# Patient Record
Sex: FEMALE | Race: BLACK OR AFRICAN AMERICAN | Employment: OTHER | URBAN - METROPOLITAN AREA
[De-identification: names, ages, dates, MRNs, and addresses within clinical notes are randomized per-mention and may not be internally consistent; named-entity substitution may affect disease eponyms.]

---

## 2017-01-19 ENCOUNTER — OFFICE VISIT (OUTPATIENT)
Dept: INTERNAL MEDICINE CLINIC | Age: 67
End: 2017-01-19

## 2017-01-19 VITALS
HEIGHT: 68 IN | WEIGHT: 225 LBS | BODY MASS INDEX: 34.1 KG/M2 | DIASTOLIC BLOOD PRESSURE: 69 MMHG | HEART RATE: 59 BPM | RESPIRATION RATE: 16 BRPM | TEMPERATURE: 98.1 F | OXYGEN SATURATION: 99 % | SYSTOLIC BLOOD PRESSURE: 132 MMHG

## 2017-01-19 DIAGNOSIS — E78.00 PURE HYPERCHOLESTEROLEMIA: ICD-10-CM

## 2017-01-19 DIAGNOSIS — I10 ESSENTIAL HYPERTENSION: Primary | ICD-10-CM

## 2017-01-19 RX ORDER — ASPIRIN 81 MG/1
TABLET ORAL DAILY
COMMUNITY
End: 2022-07-18

## 2017-01-19 NOTE — MR AVS SNAPSHOT
Visit Information Date & Time Provider Department Dept. Phone Encounter #  
 1/19/2017 10:45 AM Sariah Galindo MD Former[facility], [facility] [phone] [id number] Follow-up Instructions Return in about 6 months (around 7/19/2017). Follow-up and Disposition History Upcoming Health Maintenance Date Due Hepatitis C Screening 1950 COLONOSCOPY 5/2/2018 BREAST CANCER SCRN MAMMOGRAM 11/22/2018 DTaP/Tdap/Td series (2 - Td) 9/5/2023 Allergies as of 1/19/2017  Review Complete On: 1/19/2017 By: Harvinder Sweeney LPN Severity Noted Reaction Type Reactions Percocet [Oxycodone-acetaminophen]  04/30/2010    Unknown (comments) Sharp pain in neck Current Immunizations  Reviewed on 9/30/2016 Name Date Influenza Vaccine 9/26/2016, 9/8/2015, 11/26/2014  9:53 AM, 10/18/2013 Influenza Vaccine Split 10/16/2012 Pneumococcal Polysaccharide (PPSV-23) 10/16/2013 Pneumococcal Vaccine (Unspecified Type) 1/1/2012 Tdap 9/5/2013 Zoster Vaccine, Live 10/6/2009 Not reviewed this visit You Were Diagnosed With   
  
 Codes Comments Essential hypertension    -  Primary ICD-10-CM: I10 
ICD-9-CM: 401.9 Pure hypercholesterolemia     ICD-10-CM: E78.00 ICD-9-CM: 272.0 Vitals BP Pulse Temp Resp Height(growth percentile) Weight(growth percentile) 132/69 (BP 1 Location: Left arm, BP Patient Position: Sitting) (!) 59 98.1 °F (36.7 °C) (Oral) 16 5' 8\" (1.727 m) 225 lb (102.1 kg) LMP SpO2 BMI OB Status Smoking Status 09/17/1987 99% 34.21 kg/m2 Hysterectomy Former Smoker BMI and BSA Data Body Mass Index Body Surface Area  
 34.21 kg/m 2 2.21 m 2 Preferred Pharmacy Pharmacy Name Phone [facility] [phone] Your Updated Medication List  
  
   
This list is accurate as of: 1/19/17 11:10 AM.  Always use your most recent med list.  
  
  
  
  
 * aspirin delayed-release 81 mg tablet Take  by mouth daily. * aspirin delayed-release 325 mg tablet  
  
 atenolol-chlorthalidone 50-25 mg per tablet Commonly known as:  TENORETIC  
TAKE ONE TABLET BY MOUTH ONCE DAILY  
  
 DOC-Q-LACE 100 mg capsule Generic drug:  docusate sodium  
  
 lisinopril 40 mg tablet Commonly known as:  PRINIVIL, ZESTRIL  
TAKE ONE TABLET BY MOUTH ONCE DAILY  
  
 simvastatin 10 mg tablet Commonly known as:  ZOCOR  
TAKE ONE TABLET BY MOUTH NIGHTLY * Notice: This list has 2 medication(s) that are the same as other medications prescribed for you. Read the directions carefully, and ask your doctor or other care provider to review them with you. We Performed the Following LIPID PANEL [75310 CPT(R)] METABOLIC PANEL, COMPREHENSIVE [77667 CPT(R)] Follow-up Instructions Return in about 6 months (around 7/19/2017). Please provide this summary of care documentation to your next provider. Your primary care clinician is listed as [id number] [address] [place] [address] [address]. If you have any questions after today's visit, please call [phone].

## 2017-01-19 NOTE — PROGRESS NOTES
Chief Complaint   Patient presents with    Hypertension     6mo f/u    Cholesterol Problem     1. Have you been to the ER, urgent care clinic since your last visit? Hospitalized since your last visit? No    2. Have you seen or consulted any other health care providers outside of the [address] [place] [address] since your last visit? Include any pap smears or colon screening.  Yes- Dr. Jostin Hi (ortho)

## 2017-01-19 NOTE — PROGRESS NOTES
HISTORY OF PRESENT ILLNESS  Francis Mendez is a 61 y.o. female. HPI  Here for htn. She is well controlled. She is on a statin for hld. She is due for labs. She is seeing a spine specialist for legs buckling. Past Medical History   Diagnosis Date    Arthritis      ALL OVER    GERD (gastroesophageal reflux disease)     Gout     HTN (hypertension)     Hypercholesterolemia     Nephrolithiasis     Neuropathy     Polyclonal gammopathy     Pseudotumor (inflammatory) of orbit     Stroke (HCC)      TIA    TIA (transient ischemic attack)      complicated migraine    Urosepsis      Current Outpatient Prescriptions   Medication Sig    aspirin delayed-release 81 mg tablet Take  by mouth daily.  simvastatin (ZOCOR) 10 mg tablet TAKE ONE TABLET BY MOUTH NIGHTLY    atenolol-chlorthalidone (TENORETIC) 50-25 mg per tablet TAKE ONE TABLET BY MOUTH ONCE DAILY    lisinopril (PRINIVIL, ZESTRIL) 40 mg tablet TAKE ONE TABLET BY MOUTH ONCE DAILY    DOC-Q-LACE 100 mg capsule     aspirin delayed-release 325 mg tablet      No current facility-administered medications for this visit. Review of Systems   All other systems reviewed and are negative. Visit Vitals    /69 (BP 1 Location: Left arm, BP Patient Position: Sitting)    Pulse (!) 59    Temp 98.1 °F (36.7 °C) (Oral)    Resp 16    Ht 5' 8\" (1.727 m)    Wt 225 lb (102.1 kg)    LMP 09/17/1987    SpO2 99%    BMI 34.21 kg/m2       Physical Exam   Constitutional: She appears well-developed and well-nourished. Nursing note and vitals reviewed. Lab Results   Component Value Date/Time    Cholesterol, total 122 07/26/2016 11:41 AM    HDL Cholesterol 39 07/26/2016 11:41 AM    LDL, calculated 54 07/26/2016 11:41 AM    VLDL, calculated 29 07/26/2016 11:41 AM    Triglyceride 146 07/26/2016 11:41 AM    CHOL/HDL Ratio 4.0 09/17/2010 11:07 AM       ASSESSMENT and Lanie Sanchez was seen today for hypertension and cholesterol problem.     Diagnoses and all orders for this visit:    Essential hypertension  -     METABOLIC PANEL, COMPREHENSIVE  The current medical regimen is effective;  continue present plan and medications. Pure hypercholesterolemia  -     LIPID PANEL  The current medical regimen is effective;  continue present plan and medications.       Reviewed plan of care with the patient who has provided input and agrees with the goals

## 2017-01-20 LAB
ALBUMIN SERPL-MCNC: 4 G/DL (ref 3.6–4.8)
ALBUMIN/GLOB SERPL: 1.2 {RATIO} (ref 1.1–2.5)
ALP SERPL-CCNC: 80 IU/L (ref 39–117)
ALT SERPL-CCNC: 19 IU/L (ref 0–32)
AST SERPL-CCNC: 22 IU/L (ref 0–40)
BILIRUB SERPL-MCNC: 0.3 MG/DL (ref 0–1.2)
BUN SERPL-MCNC: 15 MG/DL (ref 8–27)
BUN/CREAT SERPL: 18 (ref 11–26)
CALCIUM SERPL-MCNC: 9.4 MG/DL (ref 8.7–10.3)
CHLORIDE SERPL-SCNC: 101 MMOL/L (ref 96–106)
CHOLEST SERPL-MCNC: 119 MG/DL (ref 100–199)
CO2 SERPL-SCNC: 26 MMOL/L (ref 18–29)
CREAT SERPL-MCNC: 0.83 MG/DL (ref 0.57–1)
GLOBULIN SER CALC-MCNC: 3.3 G/DL (ref 1.5–4.5)
GLUCOSE SERPL-MCNC: 88 MG/DL (ref 65–99)
HDLC SERPL-MCNC: 33 MG/DL
INTERPRETATION, 910389: NORMAL
LDLC SERPL CALC-MCNC: 49 MG/DL (ref 0–99)
POTASSIUM SERPL-SCNC: 4.2 MMOL/L (ref 3.5–5.2)
PROT SERPL-MCNC: 7.3 G/DL (ref 6–8.5)
SODIUM SERPL-SCNC: 144 MMOL/L (ref 134–144)
TRIGL SERPL-MCNC: 183 MG/DL (ref 0–149)
VLDLC SERPL CALC-MCNC: 37 MG/DL (ref 5–40)

## 2017-02-20 RX ORDER — LISINOPRIL 40 MG/1
TABLET ORAL
Qty: 90 TAB | Refills: 0 | Status: SHIPPED | OUTPATIENT
Start: 2017-02-20 | End: 2017-05-27 | Stop reason: SDUPTHER

## 2017-02-20 RX ORDER — ATENOLOL AND CHLORTHALIDONE TABLET 50; 25 MG/1; MG/1
TABLET ORAL
Qty: 90 TAB | Refills: 0 | Status: SHIPPED | OUTPATIENT
Start: 2017-02-20 | End: 2017-05-27 | Stop reason: SDUPTHER

## 2017-02-22 ENCOUNTER — TELEPHONE (OUTPATIENT)
Dept: INTERNAL MEDICINE CLINIC | Age: 67
End: 2017-02-22

## 2017-02-22 RX ORDER — BENAZEPRIL HYDROCHLORIDE 40 MG/1
40 TABLET ORAL DAILY
Qty: 90 TAB | Refills: 1 | Status: SHIPPED | OUTPATIENT
Start: 2017-02-22 | End: 2017-07-25

## 2017-02-22 NOTE — TELEPHONE ENCOUNTER
Left voicemail notifying patient that a substitute medication for the lisinopril was sent in to her pharmacy. Advised to call back with any questions.

## 2017-05-26 ENCOUNTER — TELEPHONE (OUTPATIENT)
Dept: INTERNAL MEDICINE CLINIC | Age: 67
End: 2017-05-26

## 2017-05-29 RX ORDER — LISINOPRIL 40 MG/1
TABLET ORAL
Qty: 90 TAB | Refills: 0 | Status: SHIPPED | OUTPATIENT
Start: 2017-05-29 | End: 2017-09-17 | Stop reason: SDUPTHER

## 2017-05-29 RX ORDER — ATENOLOL AND CHLORTHALIDONE TABLET 50; 25 MG/1; MG/1
TABLET ORAL
Qty: 90 TAB | Refills: 0 | Status: SHIPPED | OUTPATIENT
Start: 2017-05-29 | End: 2017-09-17 | Stop reason: SDUPTHER

## 2017-05-29 RX ORDER — SIMVASTATIN 10 MG/1
TABLET, FILM COATED ORAL
Qty: 90 TAB | Refills: 0 | Status: SHIPPED | OUTPATIENT
Start: 2017-05-29 | End: 2017-09-17 | Stop reason: SDUPTHER

## 2017-07-25 ENCOUNTER — OFFICE VISIT (OUTPATIENT)
Dept: INTERNAL MEDICINE CLINIC | Age: 67
End: 2017-07-25

## 2017-07-25 ENCOUNTER — HOSPITAL ENCOUNTER (OUTPATIENT)
Dept: LAB | Age: 67
Discharge: HOME | End: 2017-07-25
Payer: MEDICARE

## 2017-07-25 VITALS
WEIGHT: 236 LBS | RESPIRATION RATE: 16 BRPM | HEIGHT: 68 IN | TEMPERATURE: 97.7 F | DIASTOLIC BLOOD PRESSURE: 78 MMHG | SYSTOLIC BLOOD PRESSURE: 129 MMHG | BODY MASS INDEX: 35.77 KG/M2 | OXYGEN SATURATION: 99 % | HEART RATE: 50 BPM

## 2017-07-25 DIAGNOSIS — H25.013 CORTICAL AGE-RELATED CATARACT OF BOTH EYES: ICD-10-CM

## 2017-07-25 DIAGNOSIS — Z23 NEED FOR VACCINATION WITH 13-POLYVALENT PNEUMOCOCCAL CONJUGATE VACCINE: ICD-10-CM

## 2017-07-25 DIAGNOSIS — I10 ESSENTIAL HYPERTENSION: Primary | ICD-10-CM

## 2017-07-25 DIAGNOSIS — E78.00 PURE HYPERCHOLESTEROLEMIA: ICD-10-CM

## 2017-07-25 PROCEDURE — 80061 LIPID PANEL: CPT

## 2017-07-25 PROCEDURE — 80053 COMPREHEN METABOLIC PANEL: CPT

## 2017-07-25 PROCEDURE — 85025 COMPLETE CBC W/AUTO DIFF WBC: CPT

## 2017-07-25 PROCEDURE — 36415 COLL VENOUS BLD VENIPUNCTURE: CPT

## 2017-07-25 NOTE — MR AVS SNAPSHOT
Visit Information Date & Time Provider Department Dept. Phone Encounter #  
 7/25/2017 10:30 AM Girtha Severe, MD [facility] [phone] [id number] Follow-up Instructions Return in about 6 months (around 1/25/2018). Upcoming Health Maintenance Date Due Hepatitis C Screening 1950 GLAUCOMA SCREENING Q2Y 7/21/2017 MEDICARE YEARLY EXAM 7/21/2017 INFLUENZA AGE 9 TO ADULT 8/1/2017 COLONOSCOPY 5/2/2018 Pneumococcal 65+ Low/Medium Risk (2 of 2 - PPSV23) 10/16/2018 BREAST CANCER SCRN MAMMOGRAM 11/22/2018 DTaP/Tdap/Td series (2 - Td) 9/5/2023 Allergies as of 7/25/2017  Review Complete On: 7/25/2017 By: Luli Dye Severity Noted Reaction Type Reactions Percocet [Oxycodone-acetaminophen]  04/30/2010    Unknown (comments) Sharp pain in neck Current Immunizations  Reviewed on 9/30/2016 Name Date Influenza Vaccine 9/26/2016, 9/8/2015, 11/26/2014  9:53 AM, 10/18/2013 Influenza Vaccine Split 10/16/2012 Pneumococcal Polysaccharide (PPSV-23) 10/16/2013 Pneumococcal Vaccine (Unspecified Type) 1/1/2012 Tdap 9/5/2013 Zoster Vaccine, Live 10/6/2009 Not reviewed this visit You Were Diagnosed With   
  
 Codes Comments Essential hypertension    -  Primary ICD-10-CM: I10 
ICD-9-CM: 401.9 Pure hypercholesterolemia     ICD-10-CM: E78.00 ICD-9-CM: 272.0 Need for vaccination with 13-polyvalent pneumococcal conjugate vaccine     ICD-10-CM: B76 ICD-9-CM: V03.82 Vitals BP Pulse Temp Resp Height(growth percentile) Weight(growth percentile) 129/78 (BP 1 Location: Left arm, BP Patient Position: At rest) (!) 50 97.7 °F (36.5 °C) (Oral) 16 5' 8\" (1.727 m) 236 lb (107 kg) LMP SpO2 BMI OB Status Smoking Status 09/17/1987 99% 35.88 kg/m2 Hysterectomy Former Smoker BMI and BSA Data Body Mass Index Body Surface Area  
 35.88 kg/m 2 2.27 m 2 Preferred Pharmacy Pharmacy Name Phone [address] [facility] [phone] Your Updated Medication List  
  
   
This list is accurate as of: 7/25/17 10:49 AM.  Always use your most recent med list.  
  
  
  
  
 aspirin delayed-release 81 mg tablet Take  by mouth daily. atenolol-chlorthalidone 50-25 mg per tablet Commonly known as:  TENORETIC  
TAKE ONE TABLET BY MOUTH ONCE DAILY  
  
 DOC-Q-LACE 100 mg capsule Generic drug:  docusate sodium  
  
 lisinopril 40 mg tablet Commonly known as:  PRINIVIL, ZESTRIL  
TAKE ONE TABLET BY MOUTH ONCE DAILY  
  
 simvastatin 10 mg tablet Commonly known as:  ZOCOR  
TAKE ONE TABLET BY MOUTH AT NIGHT. We Performed the Following CBC WITH AUTOMATED DIFF [14675 CPT(R)] LIPID PANEL [29673 CPT(R)] METABOLIC PANEL, COMPREHENSIVE [20897 CPT(R)] Follow-up Instructions Return in about 6 months (around 1/25/2018). Introducing [facility] & [facility] SERVICES! Cesar Vega introduces Local Magnet patient portal. Now you can access parts of your medical record, email your doctor's office, and request medication refills online. 1. In your internet browser, go to [email or web address] 2. Click on the First Time User? Click Here link in the Sign In box. You will see the New Member Sign Up page. 3. Enter your Local Magnet Access Code exactly as it appears below. You will not need to use this code after youve completed the sign-up process. If you do not sign up before the expiration date, you must request a new code. · Local Magnet Access Code: [email or web address] Expires: 10/23/2017 10:46 AM 
 
4. Enter the last four digits of your Social Security Number (xxxx) and Date of Birth (mm/dd/yyyy) as indicated and click Submit. You will be taken to the next sign-up page. 5. Create a Edge Music Networkt ID. This will be your Edge Music Networkt login ID and cannot be changed, so think of one that is secure and easy to remember. 6. Create a Flukle password. You can change your password at any time. 7. Enter your Password Reset Question and Answer. This can be used at a later time if you forget your password. 8. Enter your e-mail address. You will receive e-mail notification when new information is available in [address] 9. Click Sign Up. You can now view and download portions of your medical record. 10. Click the Download Summary menu link to download a portable copy of your medical information. If you have questions, please visit the Frequently Asked Questions section of the Flukle website. Remember, Flukle is NOT to be used for urgent needs. For medical emergencies, dial 911. Now available from your iPhone and Android! Please provide this summary of care documentation to your next provider. Your primary care clinician is listed as [id number] [address] [place] [address] [address]. If you have any questions after today's visit, please call [phone].

## 2017-07-25 NOTE — PROGRESS NOTES
1. Have you been to the ER, urgent care clinic since your last visit? Hospitalized since your last visit? No    2. Have you seen or consulted any other health care providers outside of the [address] [place] [address] since your last visit? Include any pap smears or colon screening.  No       Chief Complaint   Patient presents with    Hypertension     3 months follow up    Cholesterol Problem     3 months follow up     Fasting

## 2017-07-25 NOTE — PROGRESS NOTES
HISTORY OF PRESENT ILLNESS  Shant Moralez is a 74 y.o. female. HPI  Here for htn. She is well controlled . She is on a statin for hld due for labs. Her hcm is current though she needs prenar. She will have cataract surgery soon. She is  and retired. She does water aerobics tiw and does all her house and yard work due to [employer] disability. Past Medical History:   Diagnosis Date    Arthritis     ALL OVER    GERD (gastroesophageal reflux disease)     Gout     HTN (hypertension)     Hypercholesterolemia     Nephrolithiasis     Neuropathy (HCC)     Polyclonal gammopathy     Pseudotumor (inflammatory) of orbit     Stroke (HCC)     TIA    TIA (transient ischemic attack)     complicated migraine    Urosepsis      Current Outpatient Prescriptions   Medication Sig    lisinopril (PRINIVIL, ZESTRIL) 40 mg tablet TAKE ONE TABLET BY MOUTH ONCE DAILY    simvastatin (ZOCOR) 10 mg tablet TAKE ONE TABLET BY MOUTH AT NIGHT.  atenolol-chlorthalidone (TENORETIC) 50-25 mg per tablet TAKE ONE TABLET BY MOUTH ONCE DAILY    aspirin delayed-release 81 mg tablet Take  by mouth daily.  DOC-Q-LACE 100 mg capsule      No current facility-administered medications for this visit. Family History   Problem Relation Age of Onset    Hypertension Mother     Stroke Mother        Review of Systems   All other systems reviewed and are negative. Visit Vitals    /78 (BP 1 Location: Left arm, BP Patient Position: At rest)    Pulse (!) 50    Temp 97.7 °F (36.5 °C) (Oral)    Resp 16    Ht 5' 8\" (1.727 m)    Wt 236 lb (107 kg)    LMP 09/17/1987    SpO2 99%    BMI 35.88 kg/m2       Physical Exam   Constitutional: She appears well-developed and well-nourished. Cardiovascular: Normal rate, regular rhythm and normal heart sounds. Pulmonary/Chest: Effort normal and breath sounds normal. No respiratory distress. She has no wheezes. She has no rales. Psychiatric: She has a normal mood and affect.  Her behavior is normal. Thought content normal.   Nursing note and vitals reviewed. ASSESSMENT and PLAN  Wilfredo Londono was seen today for hypertension and cholesterol problem. Diagnoses and all orders for this visit:    Essential hypertension  -     METABOLIC PANEL, COMPREHENSIVE  -     CBC WITH AUTOMATED DIFF  The current medical regimen is effective;  continue present plan and medications. Pure hypercholesterolemia  -     LIPID PANEL  The current medical regimen is effective;  continue present plan and medications. Need for vaccination with 13-polyvalent pneumococcal conjugate vaccine  Rx given.     Cataracts      Reviewed plan of care with the patient who has provided input and agrees with the goals

## 2017-07-26 LAB
ALBUMIN SERPL-MCNC: 4 G/DL (ref 3.6–4.8)
ALBUMIN/GLOB SERPL: 1.2 {RATIO} (ref 1.2–2.2)
ALP SERPL-CCNC: 82 IU/L (ref 39–117)
ALT SERPL-CCNC: 23 IU/L (ref 0–32)
AST SERPL-CCNC: 22 IU/L (ref 0–40)
BASOPHILS # BLD AUTO: 0 X10E3/UL (ref 0–0.2)
BASOPHILS NFR BLD AUTO: 0 %
BILIRUB SERPL-MCNC: 0.3 MG/DL (ref 0–1.2)
BUN SERPL-MCNC: 16 MG/DL (ref 8–27)
BUN/CREAT SERPL: 20 (ref 12–28)
CALCIUM SERPL-MCNC: 9.6 MG/DL (ref 8.7–10.3)
CHLORIDE SERPL-SCNC: 103 MMOL/L (ref 96–106)
CHOLEST SERPL-MCNC: 130 MG/DL (ref 100–199)
CO2 SERPL-SCNC: 29 MMOL/L (ref 18–29)
CREAT SERPL-MCNC: 0.82 MG/DL (ref 0.57–1)
EOSINOPHIL # BLD AUTO: 0.5 X10E3/UL (ref 0–0.4)
EOSINOPHIL NFR BLD AUTO: 8 %
ERYTHROCYTE [DISTWIDTH] IN BLOOD BY AUTOMATED COUNT: 13.9 % (ref 12.3–15.4)
GLOBULIN SER CALC-MCNC: 3.3 G/DL (ref 1.5–4.5)
GLUCOSE SERPL-MCNC: 80 MG/DL (ref 65–99)
HCT VFR BLD AUTO: 37.6 % (ref 34–46.6)
HDLC SERPL-MCNC: 38 MG/DL
HGB BLD-MCNC: 12.2 G/DL (ref 11.1–15.9)
IMM GRANULOCYTES # BLD: 0 X10E3/UL (ref 0–0.1)
IMM GRANULOCYTES NFR BLD: 0 %
INTERPRETATION, 910389: NORMAL
LDLC SERPL CALC-MCNC: 56 MG/DL (ref 0–99)
LYMPHOCYTES # BLD AUTO: 2.7 X10E3/UL (ref 0.7–3.1)
LYMPHOCYTES NFR BLD AUTO: 37 %
MCH RBC QN AUTO: 30.4 PG (ref 26.6–33)
MCHC RBC AUTO-ENTMCNC: 32.4 G/DL (ref 31.5–35.7)
MCV RBC AUTO: 94 FL (ref 79–97)
MONOCYTES # BLD AUTO: 0.4 X10E3/UL (ref 0.1–0.9)
MONOCYTES NFR BLD AUTO: 6 %
NEUTROPHILS # BLD AUTO: 3.4 X10E3/UL (ref 1.4–7)
NEUTROPHILS NFR BLD AUTO: 49 %
PLATELET # BLD AUTO: 207 X10E3/UL (ref 150–379)
POTASSIUM SERPL-SCNC: 4 MMOL/L (ref 3.5–5.2)
PROT SERPL-MCNC: 7.3 G/DL (ref 6–8.5)
RBC # BLD AUTO: 4.01 X10E6/UL (ref 3.77–5.28)
SODIUM SERPL-SCNC: 145 MMOL/L (ref 134–144)
TRIGL SERPL-MCNC: 181 MG/DL (ref 0–149)
VLDLC SERPL CALC-MCNC: 36 MG/DL (ref 5–40)
WBC # BLD AUTO: 7.1 X10E3/UL (ref 3.4–10.8)

## 2017-09-17 RX ORDER — ATENOLOL AND CHLORTHALIDONE TABLET 50; 25 MG/1; MG/1
TABLET ORAL
Qty: 90 TAB | Refills: 0 | Status: SHIPPED | OUTPATIENT
Start: 2017-09-17 | End: 2018-01-08 | Stop reason: SDUPTHER

## 2017-09-17 RX ORDER — SIMVASTATIN 10 MG/1
TABLET, FILM COATED ORAL
Qty: 90 TAB | Refills: 0 | Status: SHIPPED | OUTPATIENT
Start: 2017-09-17 | End: 2018-01-08 | Stop reason: SDUPTHER

## 2017-09-17 RX ORDER — LISINOPRIL 40 MG/1
TABLET ORAL
Qty: 90 TAB | Refills: 0 | Status: SHIPPED | OUTPATIENT
Start: 2017-09-17 | End: 2018-01-08 | Stop reason: SDUPTHER

## 2017-10-10 ENCOUNTER — OFFICE VISIT (OUTPATIENT)
Dept: INTERNAL MEDICINE CLINIC | Age: 67
End: 2017-10-10

## 2017-10-10 VITALS
BODY MASS INDEX: 35.1 KG/M2 | HEART RATE: 56 BPM | TEMPERATURE: 97.8 F | SYSTOLIC BLOOD PRESSURE: 131 MMHG | HEIGHT: 68 IN | OXYGEN SATURATION: 99 % | RESPIRATION RATE: 16 BRPM | DIASTOLIC BLOOD PRESSURE: 72 MMHG | WEIGHT: 231.6 LBS

## 2017-10-10 DIAGNOSIS — I10 ESSENTIAL HYPERTENSION: ICD-10-CM

## 2017-10-10 DIAGNOSIS — H25.013 CORTICAL AGE-RELATED CATARACT OF BOTH EYES: ICD-10-CM

## 2017-10-10 DIAGNOSIS — Z01.810 PREOP CARDIOVASCULAR EXAM: Primary | ICD-10-CM

## 2017-10-10 NOTE — PROGRESS NOTES
1. Have you been to the ER, urgent care clinic since your last visit? Hospitalized since your last visit? No    2. Have you seen or consulted any other health care providers outside of the [address] [place] [address] since your last visit? Include any pap smears or colon screening.  No   Chief Complaint   Patient presents with    Pre-op Exam     Not fasting

## 2017-10-10 NOTE — PROGRESS NOTES
HISTORY OF PRESENT ILLNESS  Nick Jeffrey is a 74 y.o. female. HPI  Here for preop check prior to bilateral cataract extraction one week apart. Her HTN is well controlled. She doing aerobics three times weekly with no CP. Past Medical History:   Diagnosis Date    Arthritis     ALL OVER    GERD (gastroesophageal reflux disease)     Gout     HTN (hypertension)     Hypercholesterolemia     Nephrolithiasis     Neuropathy     Polyclonal gammopathy     Pseudotumor (inflammatory) of orbit     Stroke (HCC)     TIA    TIA (transient ischemic attack)     complicated migraine    Urosepsis      Current Outpatient Prescriptions   Medication Sig    atenolol-chlorthalidone (TENORETIC) 50-25 mg per tablet TAKE ONE TABLET BY MOUTH ONCE DAILY    simvastatin (ZOCOR) 10 mg tablet TAKE ONE TABLET BY MOUTH AT NIGHT    lisinopril (PRINIVIL, ZESTRIL) 40 mg tablet TAKE ONE TABLET BY MOUTH ONCE DAILY    aspirin delayed-release 81 mg tablet Take  by mouth daily.  DOC-Q-LACE 100 mg capsule      No current facility-administered medications for this visit. Review of Systems   All other systems reviewed and are negative. Visit Vitals    /72 (BP 1 Location: Left arm, BP Patient Position: At rest)    Pulse (!) 56    Temp 97.8 °F (36.6 °C) (Oral)    Resp 16    Ht 5' 8\" (1.727 m)    Wt 231 lb 9.6 oz (105.1 kg)    LMP 09/17/1987    SpO2 99%    BMI 35.21 kg/m2       Physical Exam   Constitutional: She appears well-developed and well-nourished. Cardiovascular: Normal rate, regular rhythm and normal heart sounds. Pulmonary/Chest: Effort normal and breath sounds normal. No respiratory distress. She has no wheezes. She has no rales. Nursing note and vitals reviewed. ASSESSMENT and PLAN  Diagnoses and all orders for this visit:    1. Preop cardiovascular exam  Clear for surgery. 2. Cortical age-related cataract of both eyes    3.  Essential hypertension  The current medical regimen is effective;  continue present plan and medications.       Reviewed plan of care with the patient who has provided input and agrees with the goals

## 2017-10-10 NOTE — MR AVS SNAPSHOT
Visit Information Date & Time Provider Department Dept. Phone Encounter #  
 10/10/2017  2:45 PM Heydi Mandujano MD [facility] [phone] [id number] Upcoming Health Maintenance Date Due Hepatitis C Screening 1950 GLAUCOMA SCREENING Q2Y 7/21/2017 MEDICARE YEARLY EXAM 7/21/2017 COLONOSCOPY 5/2/2018 Pneumococcal 65+ Low/Medium Risk (2 of 2 - PPSV23) 10/16/2018 BREAST CANCER SCRN MAMMOGRAM 11/22/2018 DTaP/Tdap/Td series (2 - Td) 9/5/2023 Allergies as of 10/10/2017  Review Complete On: 10/10/2017 By: Roxanna Galo Severity Noted Reaction Type Reactions Percocet [Oxycodone-acetaminophen]  04/30/2010    Unknown (comments) Sharp pain in neck Current Immunizations  Reviewed on 8/16/2017 Name Date Influenza High Dose Vaccine PF 8/10/2017 Influenza Vaccine 9/26/2016, 9/8/2015, 11/26/2014  9:53 AM, 10/18/2013 Influenza Vaccine Split 10/16/2012 Pneumococcal Conjugate (PCV-13) 8/10/2017 Pneumococcal Polysaccharide (PPSV-23) 10/16/2013 Tdap 9/5/2013 ZZZ-RETIRED (DO NOT USE) Pneumococcal Vaccine (Unspecified Type) 1/1/2012 Zoster Vaccine, Live 10/6/2009 Not reviewed this visit You Were Diagnosed With   
  
 Codes Comments Preop cardiovascular exam    -  Primary ICD-10-CM: Z01.810 ICD-9-CM: V72.81 Cortical age-related cataract of both eyes     ICD-10-CM: H25.013 
ICD-9-CM: 366.15 Essential hypertension     ICD-10-CM: I10 
ICD-9-CM: 401.9 Vitals BP Pulse Temp Resp Height(growth percentile) Weight(growth percentile) 131/72 (BP 1 Location: Left arm, BP Patient Position: At rest) (!) 56 97.8 °F (36.6 °C) (Oral) 16 5' 8\" (1.727 m) 231 lb 9.6 oz (105.1 kg) LMP SpO2 BMI OB Status Smoking Status 09/17/1987 99% 35.21 kg/m2 Hysterectomy Former Smoker BMI and BSA Data Body Mass Index Body Surface Area  
 35.21 kg/m 2 2.25 m 2 Preferred Pharmacy Pharmacy Name Phone [facility] [phone] Your Updated Medication List  
  
   
This list is accurate as of: 10/10/17  3:04 PM.  Always use your most recent med list.  
  
  
  
  
 aspirin delayed-release 81 mg tablet Take  by mouth daily. atenolol-chlorthalidone 50-25 mg per tablet Commonly known as:  TENORETIC  
TAKE ONE TABLET BY MOUTH ONCE DAILY  
  
 DOC-Q-LACE 100 mg capsule Generic drug:  docusate sodium  
  
 lisinopril 40 mg tablet Commonly known as:  PRINIVIL, ZESTRIL  
TAKE ONE TABLET BY MOUTH ONCE DAILY  
  
 simvastatin 10 mg tablet Commonly known as:  ZOCOR  
TAKE ONE TABLET BY MOUTH AT NIGHT Introducing [facility] SERVICES! Magali Arboleda introduces Adwanted patient portal. Now you can access parts of your medical record, email your doctor's office, and request medication refills online. 1. In your internet browser, go to [email or web address] 2. Click on the First Time User? Click Here link in the Sign In box. You will see the New Member Sign Up page. 3. Enter your Adwanted Access Code exactly as it appears below. You will not need to use this code after youve completed the sign-up process. If you do not sign up before the expiration date, you must request a new code. · Adwanted Access Code: [email or web address] Expires: 10/23/2017 10:46 AM 
 
4. Enter the last four digits of your Social Security Number (xxxx) and Date of Birth (mm/dd/yyyy) as indicated and click Submit. You will be taken to the next sign-up page. 5. Create a Adwanted ID. This will be your Adwanted login ID and cannot be changed, so think of one that is secure and easy to remember. 6. Create a Adwanted password. You can change your password at any time. 7. Enter your Password Reset Question and Answer. This can be used at a later time if you forget your password. 8. Enter your e-mail address. You will receive e-mail notification when new information is available in [address] 9. Click Sign Up. You can now view and download portions of your medical record. 10. Click the Download Summary menu link to download a portable copy of your medical information. If you have questions, please visit the Frequently Asked Questions section of the NeoReach website. Remember, NeoReach is NOT to be used for urgent needs. For medical emergencies, dial 911. Now available from your iPhone and Android! Please provide this summary of care documentation to your next provider. Your primary care clinician is listed as [phone] [address] [place] [address] [address]. If you have any questions after today's visit, please call [phone].

## 2017-10-24 ENCOUNTER — OFFICE VISIT (OUTPATIENT)
Dept: INTERNAL MEDICINE CLINIC | Age: 67
End: 2017-10-24

## 2017-10-24 ENCOUNTER — HOSPITAL ENCOUNTER (OUTPATIENT)
Dept: LAB | Age: 67
Discharge: HOME | End: 2017-10-24
Payer: MEDICARE

## 2017-10-24 ENCOUNTER — HOSPITAL ENCOUNTER (OUTPATIENT)
Dept: GENERAL RADIOLOGY | Age: 67
Discharge: HOME | End: 2017-10-24
Attending: PHYSICIAN ASSISTANT
Payer: MEDICARE

## 2017-10-24 VITALS
DIASTOLIC BLOOD PRESSURE: 87 MMHG | BODY MASS INDEX: 35.31 KG/M2 | HEIGHT: 68 IN | TEMPERATURE: 97.5 F | RESPIRATION RATE: 20 BRPM | SYSTOLIC BLOOD PRESSURE: 148 MMHG | OXYGEN SATURATION: 99 % | WEIGHT: 233 LBS | HEART RATE: 50 BPM

## 2017-10-24 DIAGNOSIS — R31.9 HEMATURIA, UNSPECIFIED TYPE: ICD-10-CM

## 2017-10-24 DIAGNOSIS — Z87.442 HISTORY OF KIDNEY STONES: ICD-10-CM

## 2017-10-24 DIAGNOSIS — R31.9 HEMATURIA, UNSPECIFIED TYPE: Primary | ICD-10-CM

## 2017-10-24 LAB
BILIRUB UR QL STRIP: NEGATIVE
GLUCOSE UR-MCNC: NEGATIVE MG/DL
KETONES P FAST UR STRIP-MCNC: NEGATIVE MG/DL
PH UR STRIP: 7 [PH] (ref 4.6–8)
PROT UR QL STRIP: NORMAL MG/DL
SP GR UR STRIP: 1.01 (ref 1–1.03)
UA UROBILINOGEN AMB POC: NORMAL (ref 0.2–1)
URINALYSIS CLARITY POC: CLEAR
URINALYSIS COLOR POC: YELLOW
URINE BLOOD POC: NORMAL
URINE LEUKOCYTES POC: NORMAL
URINE NITRITES POC: NEGATIVE

## 2017-10-24 PROCEDURE — 74000 XR ABD (KUB): CPT

## 2017-10-24 PROCEDURE — 87086 URINE CULTURE/COLONY COUNT: CPT

## 2017-10-24 RX ORDER — NITROFURANTOIN 25; 75 MG/1; MG/1
100 CAPSULE ORAL 2 TIMES DAILY
Qty: 10 CAP | Refills: 0 | Status: SHIPPED | OUTPATIENT
Start: 2017-10-24 | End: 2018-10-08 | Stop reason: SDUPTHER

## 2017-10-24 RX ORDER — KETOROLAC TROMETHAMINE 5 MG/ML
SOLUTION OPHTHALMIC
COMMUNITY
Start: 2017-09-21 | End: 2018-07-12

## 2017-10-24 RX ORDER — OFLOXACIN 3 MG/ML
SOLUTION/ DROPS OPHTHALMIC
COMMUNITY
Start: 2017-09-21 | End: 2018-07-12

## 2017-10-24 RX ORDER — PREDNISOLONE ACETATE 10 MG/ML
SUSPENSION/ DROPS OPHTHALMIC
COMMUNITY
Start: 2017-09-21 | End: 2018-07-12

## 2017-10-24 NOTE — PROGRESS NOTES
Reviewed record in preparation for visit and have obtained necessary documentation. Identified pt with two pt identifiers(name and ). Health Maintenance Due   Topic    Hepatitis C Screening     GLAUCOMA SCREENING Q2Y     MEDICARE YEARLY EXAM          No chief complaint on file. Wt Readings from Last 3 Encounters:   10/24/17 233 lb (105.7 kg)   10/10/17 231 lb 9.6 oz (105.1 kg)   17 236 lb (107 kg)     Temp Readings from Last 3 Encounters:   10/24/17 97.5 °F (36.4 °C) (Oral)   10/10/17 97.8 °F (36.6 °C) (Oral)   17 97.7 °F (36.5 °C) (Oral)     BP Readings from Last 3 Encounters:   10/10/17 131/72   17 129/78   17 132/69     Pulse Readings from Last 3 Encounters:   10/10/17 (!) 56   17 (!) 50   17 (!) 59           Learning Assessment:  :     Learning Assessment 10/24/2017 2016 2014   PRIMARY LEARNER Patient Patient Patient   PRIMARY LANGUAGE ENGLISH ENGLISH ENGLISH   LEARNER PREFERENCE PRIMARY LISTENING READING READING   ANSWERED BY self Self patient   RELATIONSHIP SELF SELF SELF       Depression Screening:  :     PHQ over the last two weeks 10/10/2017   Little interest or pleasure in doing things Not at all   Feeling down, depressed or hopeless Not at all   Total Score PHQ 2 0       Fall Risk Assessment:  :     Fall Risk Assessment, last 12 mths 10/10/2017   Able to walk? Yes   Fall in past 12 months? No       Abuse Screening:  :     Abuse Screening Questionnaire 2017   Do you ever feel afraid of your partner? N N   Are you in a relationship with someone who physically or mentally threatens you? N N   Is it safe for you to go home? Y Y       Coordination of Care Questionnaire:  :     1) Have you been to an emergency room, urgent care clinic since your last visit? no   Hospitalized since your last visit? no             2) Have you seen or consulted any other health care providers outside of [address] [place] [address] since your last visit? no  (Include any pap smears or colon screenings in this section.)    3) Do you have an Advance Directive on file? yes    4) Are you interested in receiving information on Advance Directives? NO      Patient is accompanied by self I have received verbal consent from [facility] to discuss any/all medical information while they are present in the room.

## 2017-10-24 NOTE — PROGRESS NOTES
Subjective:     Lakeisha Denny is a 74 y.o. female who complains of frequency, hematuria, pain in mild cramping lower abdomen, mild back pain for 3 days. Patient denies vomiting, fever, unusual vaginal discharge. Patient does not have a history of recurrent UTI. Patient does not have a history of pyelonephritis. Patient does have a history of kidney stones. She reports one time she became septic due to complication with kidney stones. Patient Active Problem List    Diagnosis Date Noted    Cortical age-related cataract of both eyes 07/25/2017    Essential hypertension 01/19/2017    Pure hypercholesterolemia 01/19/2017    S/P prosthetic total arthroplasty of the hip 06/13/2012    Nephrolithiasis     Urosepsis     Pseudotumor (inflammatory) of orbit      Allergies   Allergen Reactions    Percocet [Oxycodone-Acetaminophen] Unknown (comments)     Sharp pain in neck        Review of Systems  Pertinent items are noted in HPI. Objective:     Visit Vitals    /87    Pulse (!) 50    Temp 97.5 °F (36.4 °C) (Oral)    Resp 20    Ht 5' 8\" (1.727 m)    Wt 233 lb (105.7 kg)    LMP 09/17/1987    SpO2 99%    BMI 35.43 kg/m2     General:  alert, cooperative, no distress   Abdomen: soft, nontender, nondistended, no masses or organomegaly. Back:  CVA tenderness absent   :  defer exam       Assessment/Plan:     hematuria     1. nitrofurantoin  2. Maintain adequate hydration  3. May use OTC pyridium as desired, which will turn urine orange/red color  4. Follow up if symptoms not improving, and prn. Diagnoses and all orders for this visit:    1. Hematuria, unspecified type  -     XR ABD (KUB); Future  -     nitrofurantoin, macrocrystal-monohydrate, (MACROBID) 100 mg capsule; Take 1 Cap by mouth two (2) times a day. 2. History of kidney stones  -     XR ABD (KUB); Future    . patient is going to start treatment for UTI. Advised her to keep the fluids going.  I will contact her with the results of the KUB once back. All this was discussed with the patient and she understands and agree.

## 2017-10-24 NOTE — PROGRESS NOTES
Please let the  Patient know no kidney stones noted. He did make comments about several non-specific tiny bilateral pelvic calcifications. We will contact her once the urine culture is back.  thanks

## 2017-10-26 LAB — BACTERIA UR CULT: NORMAL

## 2017-10-26 NOTE — PROGRESS NOTES
Please contact the patient and let the patient know more than two organisms grew but none was predominant. The lab suggested giving another sample if still having symptoms.  thanks

## 2017-10-26 NOTE — PROGRESS NOTES
Writer contacted patient to inform of xray per Stefani, patient verbalized understanding and states feeling better and blood noted.

## 2017-12-14 ENCOUNTER — HOSPITAL ENCOUNTER (OUTPATIENT)
Dept: MAMMOGRAPHY | Age: 67
Discharge: HOME | End: 2017-12-14
Payer: MEDICARE

## 2017-12-14 DIAGNOSIS — Z12.31 VISIT FOR SCREENING MAMMOGRAM: ICD-10-CM

## 2017-12-14 PROCEDURE — 77067 SCR MAMMO BI INCL CAD: CPT

## 2018-05-29 RX ORDER — SIMVASTATIN 10 MG/1
TABLET, FILM COATED ORAL
Qty: 90 TAB | Refills: 0 | Status: SHIPPED | OUTPATIENT
Start: 2018-05-29 | End: 2018-07-12 | Stop reason: SDUPTHER

## 2018-05-29 RX ORDER — LISINOPRIL 40 MG/1
TABLET ORAL
Qty: 90 TAB | Refills: 0 | Status: SHIPPED | OUTPATIENT
Start: 2018-05-29 | End: 2018-07-12 | Stop reason: SDUPTHER

## 2018-05-29 RX ORDER — ATENOLOL AND CHLORTHALIDONE TABLET 50; 25 MG/1; MG/1
TABLET ORAL
Qty: 90 TAB | Refills: 0 | Status: SHIPPED | OUTPATIENT
Start: 2018-05-29 | End: 2018-07-12 | Stop reason: SDUPTHER

## 2018-05-29 NOTE — TELEPHONE ENCOUNTER
Patient walked in our office requesting a 90 day supply of her BP med and cholesterol med. She does not want to pay twice for these meds but is out of them now. Can this be done?  Please call and advise   [phone]

## 2018-07-12 ENCOUNTER — OFFICE VISIT (OUTPATIENT)
Dept: INTERNAL MEDICINE CLINIC | Age: 68
End: 2018-07-12

## 2018-07-12 VITALS
HEART RATE: 58 BPM | TEMPERATURE: 97.8 F | RESPIRATION RATE: 18 BRPM | OXYGEN SATURATION: 99 % | BODY MASS INDEX: 34.1 KG/M2 | HEIGHT: 68 IN | WEIGHT: 225 LBS | SYSTOLIC BLOOD PRESSURE: 135 MMHG | DIASTOLIC BLOOD PRESSURE: 88 MMHG

## 2018-07-12 DIAGNOSIS — E78.00 PURE HYPERCHOLESTEROLEMIA: ICD-10-CM

## 2018-07-12 DIAGNOSIS — Z00.00 WELLNESS EXAMINATION: Primary | ICD-10-CM

## 2018-07-12 DIAGNOSIS — Z12.11 SCREEN FOR COLON CANCER: ICD-10-CM

## 2018-07-12 DIAGNOSIS — I10 ESSENTIAL HYPERTENSION: ICD-10-CM

## 2018-07-12 RX ORDER — SIMVASTATIN 10 MG/1
TABLET, FILM COATED ORAL
Qty: 90 TAB | Refills: 1 | Status: SHIPPED | OUTPATIENT
Start: 2018-07-12 | End: 2019-03-24 | Stop reason: SDUPTHER

## 2018-07-12 RX ORDER — LISINOPRIL 40 MG/1
TABLET ORAL
Qty: 90 TAB | Refills: 1 | Status: SHIPPED | OUTPATIENT
Start: 2018-07-12 | End: 2019-03-24 | Stop reason: SDUPTHER

## 2018-07-12 RX ORDER — ATENOLOL AND CHLORTHALIDONE TABLET 50; 25 MG/1; MG/1
TABLET ORAL
Qty: 90 TAB | Refills: 1 | Status: SHIPPED | OUTPATIENT
Start: 2018-07-12 | End: 2019-03-24 | Stop reason: SDUPTHER

## 2018-07-12 NOTE — PROGRESS NOTES
HISTORY OF PRESENT ILLNESS  Jeffry Rothman is a 74 y.o. female. Chief Complaint   Patient presents with    Follow-up     Health Maintenance Due   Topic Date Due    Hepatitis C Screening  1950    MEDICARE YEARLY EXAM  03/14/2018    COLONOSCOPY  05/02/2018     HPI   Pt has been able to lose weight after recent ortho surg has improved pain! Wt Readings from Last 3 Encounters:   07/12/18 225 lb (102.1 kg)   10/24/17 233 lb (105.7 kg)   10/10/17 231 lb 9.6 oz (105.1 kg)     This is the Subsequent Medicare Annual Wellness Exam, performed 12 months or more after the Initial AWV or the last Subsequent AWV    I have reviewed the patient's medical history in detail and updated the computerized patient record.      History     Past Medical History:   Diagnosis Date    Arthritis     ALL OVER    GERD (gastroesophageal reflux disease)     Gout     HTN (hypertension)     Hypercholesterolemia     Nephrolithiasis     Neuropathy     Polyclonal gammopathy     Pseudotumor (inflammatory) of orbit     Stroke (HCC)     TIA    TIA (transient ischemic attack)     complicated migraine    Urosepsis       Past Surgical History:   Procedure Laterality Date    HX CHOLECYSTECTOMY      HX COLONOSCOPY  2008    HX ORTHOPAEDIC      BILATERAL HIP REPLACEMENTS    HX ORTHOPAEDIC      SPUR RIGHT FOOT    HX LYLE AND BSO       Current Outpatient Prescriptions   Medication Sig Dispense Refill    atenolol-chlorthalidone (TENORETIC) 50-25 mg per tablet TAKE ONE TABLET BY MOUTH ONCE DAILY 90 Tab 0    lisinopril (PRINIVIL, ZESTRIL) 40 mg tablet TAKE ONE TABLET BY MOUTH ONCE DAILY 90 Tab 0    simvastatin (ZOCOR) 10 mg tablet TAKE ONE TABLET BY MOUTH ONCE DAILY AT BEDTIME 90 Tab 0    ofloxacin (FLOXIN) 0.3 % ophthalmic solution       ketorolac (ACULAR) 0.5 % ophthalmic solution       prednisoLONE acetate (PRED FORTE) 1 % ophthalmic suspension       nitrofurantoin, macrocrystal-monohydrate, (MACROBID) 100 mg capsule Take 1 Cap by mouth two (2) times a day. 10 Cap 0    aspirin delayed-release 81 mg tablet Take  by mouth daily. Allergies   Allergen Reactions    Percocet [Oxycodone-Acetaminophen] Unknown (comments)     Sharp pain in neck     Family History   Problem Relation Age of Onset    Hypertension Mother     Stroke Mother      Social History   Substance Use Topics    Smoking status: Former Smoker     Packs/day: 0.25     Years: 20.00     Quit date: 1/30/2008    Smokeless tobacco: Never Used    Alcohol use 0.5 oz/week     1 Glasses of wine per week     Patient Active Problem List   Diagnosis Code    Nephrolithiasis N20.0    Urosepsis A41.9, N39.0    Pseudotumor (inflammatory) of orbit H05.119    S/P prosthetic total arthroplasty of the hip Z96.649    Essential hypertension I10    Pure hypercholesterolemia E78.00    Cortical age-related cataract of both eyes H25.013       Depression Risk Factor Screening:     PHQ over the last two weeks 7/12/2018   Little interest or pleasure in doing things Not at all   Feeling down, depressed or hopeless Not at all   Total Score PHQ 2 0     Alcohol Risk Factor Screening: You do not drink alcohol or very rarely. Rare 1 drink  Functional Ability and Level of Safety:   Hearing Loss  Hearing is good. Activities of Daily Living  The home contains: handrails and grab bars  Patient does total self care    Fall Risk  Fall Risk Assessment, last 12 mths 7/12/2018   Able to walk? Yes   Fall in past 12 months? Yes   Fall with injury? No   Number of falls in past 12 months 2   Fall Risk Score 2   During home renovations only.      Abuse Screen  Patient is not abused    Cognitive Screening   Evaluation of Cognitive Function:  Has your family/caregiver stated any concerns about your memory: no  Normal    Patient Care Team   Patient Care Team:  Ivet Cleary MD as PCP - General  [facility], RN as Ambulatory Care Navigator (Internal Medicine)    Assessment/Plan   Education and counseling provided:  Are appropriate based on today's review and evaluation    Health Maintenance Due   Topic Date Due    Hepatitis C Screening  1950    MEDICARE YEARLY EXAM  03/14/2018    COLONOSCOPY  05/02/2018       Advance Care Planning (ACP) Provider Note - Comprehensive     Date of ACP Conversation: 07/12/18  Persons included in Conversation:  patient  Length of ACP Conversation in minutes:  <16 minutes (Non-Billable)    Authorized Decision Maker (if patient is incapable of making informed decisions): This person is:  Healthcare Agent/Medical Power of  under Advance Directive          General ACP for ALL Patients with Decision Making Capacity:   Understanding of the healthcare agent role was assessed and information provided    Review of Existing Advance Directive:  Does this advance directive still reflect your preferences? Yes (Provide new form/Refer for assistance in updating)    For Serious or Chronic Illness:  Understanding of medical condition      Interventions Provided:  Reviewed existing Advance Directive       Review of Systems   Respiratory: Negative for shortness of breath. Cardiovascular: Negative for chest pain and palpitations. Neurological: Negative for dizziness, loss of consciousness and weakness. Physical Exam   Constitutional: She is oriented to person, place, and time. She appears well-developed and well-nourished. No distress. HENT:   Head: Normocephalic and atraumatic. Neck: Neck supple. No JVD present. No bruit bilateral carotid arteries. Cardiovascular: Normal rate, regular rhythm and normal heart sounds. Pulmonary/Chest: Effort normal and breath sounds normal. No respiratory distress. Musculoskeletal: She exhibits no edema. Neurological: She is alert and oriented to person, place, and time. Skin: Skin is warm and dry. Psychiatric: She has a normal mood and affect.  Her behavior is normal. Judgment and thought content normal. Nursing note and vitals reviewed. ASSESSMENT and PLAN    ICD-10-CM ICD-9-CM    1. Wellness examination Z00.00 V70.0 See above   2. Screen for colon cancer Z12.11 V76.51 REFERRAL TO GASTROENTEROLOGY   3. HTN - well controlled. 4. Hyperchol - controlled at last check. Recheck at 6 month follow up.

## 2018-07-12 NOTE — MR AVS SNAPSHOT
[address] [place] [address] [address] [facility] 
[phone] Patient: Mushtaq Galloway MRN:  [facility]:[id number] Visit Information Date & Time Provider Department Dept. Phone Encounter #  
 7/12/2018  9:15 AM Ariadna Guidry PA-C [facility] [phone] [id number] Upcoming Health Maintenance Date Due Hepatitis C Screening 1950 MEDICARE YEARLY EXAM 3/14/2018 COLONOSCOPY 5/2/2018 Influenza Age 5 to Adult 8/1/2018 Pneumococcal 65+ Low/Medium Risk (2 of 2 - PPSV23) 10/16/2018 GLAUCOMA SCREENING Q2Y 10/23/2019 BREAST CANCER SCRN MAMMOGRAM 12/14/2019 DTaP/Tdap/Td series (2 - Td) 9/5/2023 Allergies as of 7/12/2018  Review Complete On: 7/12/2018 By: Kayode Gold Severity Noted Reaction Type Reactions Percocet [Oxycodone-acetaminophen]  04/30/2010    Unknown (comments) Sharp pain in neck Current Immunizations  Reviewed on 8/16/2017 Name Date Influenza High Dose Vaccine PF 8/10/2017 Influenza Vaccine 9/26/2016, 9/8/2015, 11/26/2014  9:53 AM, 10/18/2013 Influenza Vaccine Split 10/16/2012 Pneumococcal Conjugate (PCV-13) 8/10/2017 Pneumococcal Polysaccharide (PPSV-23) 10/16/2013 Tdap 9/5/2013 ZZZ-RETIRED (DO NOT USE) Pneumococcal Vaccine (Unspecified Type) 1/1/2012 Zoster Vaccine, Live 10/6/2009 Not reviewed this visit You Were Diagnosed With   
  
 Codes Comments Wellness examination    -  Primary ICD-10-CM: Z00.00 ICD-9-CM: V70.0 Screen for colon cancer     ICD-10-CM: Z12.11 ICD-9-CM: V76.51 Vitals BP Pulse Temp Resp Height(growth percentile) Weight(growth percentile) 135/88 (BP 1 Location: Left arm, BP Patient Position: At rest) (!) 58 97.8 °F (36.6 °C) (Oral) 18 5' 8\" (1.727 m) 225 lb (102.1 kg) LMP SpO2 BMI OB Status Smoking Status 09/17/1987 99% 34.21 kg/m2 Hysterectomy Former Smoker BMI and BSA Data Body Mass Index Body Surface Area  
 34.21 kg/m 2 2.21 m 2 Preferred Pharmacy Pharmacy Name Phone [facility] [place]vin [phone] Your Updated Medication List  
  
   
This list is accurate as of 7/12/18  9:53 AM.  Always use your most recent med list.  
  
  
  
  
 aspirin delayed-release 81 mg tablet Take  by mouth daily. atenolol-chlorthalidone 50-25 mg per tablet Commonly known as:  TENORETIC  
TAKE ONE TABLET BY MOUTH ONCE DAILY  
  
 lisinopril 40 mg tablet Commonly known as:  PRINIVIL, ZESTRIL  
TAKE ONE TABLET BY MOUTH ONCE DAILY  
  
 nitrofurantoin (macrocrystal-monohydrate) 100 mg capsule Commonly known as:  MACROBID Take 1 Cap by mouth two (2) times a day. simvastatin 10 mg tablet Commonly known as:  ZOCOR  
TAKE ONE TABLET BY MOUTH ONCE DAILY AT BEDTIME We Performed the Following REFERRAL TO GASTROENTEROLOGY [JYS31 Custom] Comments:  
 [facility] Address: [address] [facility] [address] [place] [address] Phone: [phone] Referral Information Referral ID Referred By Referred To  
  
 [id number] [facility] [address] [facility] [phone] [place] [address] [place] [address] Kacie Visits Status Start Date End Date 1 New Request 7/12/18 7/12/19 If your referral has a status of pending review or denied, additional information will be sent to support the outcome of this decision. Please provide this summary of care documentation to your next provider. Your primary care clinician is listed as [id number] [address] [place] [address] [address]. If you have any questions after today's visit, please call [phone].

## 2018-07-12 NOTE — PROGRESS NOTES
1. Have you been to the ER, urgent care clinic since your last visit? Hospitalized since your last visit? No    2. Have you seen or consulted any other health care providers outside of the [address] [place] [address] since your last visit? Include any pap smears or colon screening.  No   Chief Complaint   Patient presents with    Follow-up     Not fasting

## 2018-10-08 ENCOUNTER — OFFICE VISIT (OUTPATIENT)
Dept: INTERNAL MEDICINE CLINIC | Age: 68
End: 2018-10-08

## 2018-10-08 VITALS
SYSTOLIC BLOOD PRESSURE: 130 MMHG | RESPIRATION RATE: 18 BRPM | BODY MASS INDEX: 34.56 KG/M2 | HEIGHT: 68 IN | WEIGHT: 228 LBS | OXYGEN SATURATION: 99 % | DIASTOLIC BLOOD PRESSURE: 80 MMHG | TEMPERATURE: 97.9 F | HEART RATE: 53 BPM

## 2018-10-08 DIAGNOSIS — N30.01 ACUTE CYSTITIS WITH HEMATURIA: Primary | ICD-10-CM

## 2018-10-08 DIAGNOSIS — I10 ESSENTIAL HYPERTENSION: ICD-10-CM

## 2018-10-08 LAB
BILIRUB UR QL STRIP: NEGATIVE
GLUCOSE UR-MCNC: NEGATIVE MG/DL
KETONES P FAST UR STRIP-MCNC: NEGATIVE MG/DL
PH UR STRIP: 6.5 [PH] (ref 4.6–8)
PROT UR QL STRIP: NORMAL
SP GR UR STRIP: 1.02 (ref 1–1.03)
UA UROBILINOGEN AMB POC: NORMAL (ref 0.2–1)
URINALYSIS CLARITY POC: NORMAL
URINALYSIS COLOR POC: NORMAL
URINE BLOOD POC: NORMAL
URINE LEUKOCYTES POC: NORMAL
URINE NITRITES POC: POSITIVE

## 2018-10-08 RX ORDER — NITROFURANTOIN 25; 75 MG/1; MG/1
100 CAPSULE ORAL 2 TIMES DAILY
Qty: 20 CAP | Refills: 0 | Status: SHIPPED | OUTPATIENT
Start: 2018-10-08 | End: 2018-10-31

## 2018-10-08 NOTE — PROGRESS NOTES
HISTORY OF PRESENT ILLNESS  Jesus Alberto Quezada is a 79 y.o. female. Chief Complaint   Patient presents with    Blood in Urine     Health Maintenance Due   Topic Date Due    Hepatitis C Screening  1950    Shingrix Vaccine Age 50> (1 of 2) 07/21/2002    COLONOSCOPY  05/02/2018    Influenza Age 9 to Adult  08/01/2018     HPI  Blood in urine yesterday. No other sx: no pain/urg/freq/back pain. Colonoscopy scheduled for 11/1/18. HTN - controlled at recheck today:   BP Readings from Last 1 Encounters:   10/08/18 130/80       Review of Systems   Constitutional: Negative for fever. Gastrointestinal: Negative for abdominal pain. Genitourinary: Positive for hematuria. Negative for dysuria, flank pain, frequency and urgency. Denies vaginal discharge. Physical Exam   Constitutional: She is oriented to person, place, and time. She appears well-developed and well-nourished. No distress. HENT:   Head: Normocephalic and atraumatic. Neck: Neck supple. No JVD present. Cardiovascular: Normal rate, regular rhythm and normal heart sounds. Pulmonary/Chest: Effort normal and breath sounds normal. No respiratory distress. Musculoskeletal: She exhibits no edema. No CV tenderness   Neurological: She is alert and oriented to person, place, and time. Skin: Skin is warm and dry. Psychiatric: She has a normal mood and affect. Her behavior is normal. Judgment and thought content normal.   Nursing note and vitals reviewed. ASSESSMENT and PLAN  Diagnoses and all orders for this visit:    1. Acute cystitis with hematuria  -     AMB POC URINALYSIS DIP STICK AUTO W/ MICRO  -     nitrofurantoin, macrocrystal-monohydrate, (MACROBID) 100 mg capsule; Take 1 Cap by mouth two (2) times a day. 2. Essential hypertension    Controlled at recheck.

## 2018-10-08 NOTE — PROGRESS NOTES
1. Have you been to the ER, urgent care clinic since your last visit? Hospitalized since your last visit? No    2. Have you seen or consulted any other health care providers outside of the [address] [place] [address] since your last visit? Include any pap smears or colon screening. No   Chief Complaint   Patient presents with    Blood in Urine     Not fasting    Abuse Screening Questionnaire 7/12/2018   Do you ever feel afraid of your partner? N   Are you in a relationship with someone who physically or mentally threatens you? N   Is it safe for you to go home? Y     PHQ over the last two weeks 10/8/2018   Little interest or pleasure in doing things Not at all   Feeling down, depressed, irritable, or hopeless Not at all   Total Score PHQ 2 0     Fall Risk Assessment, last 12 mths 10/8/2018   Able to walk? Yes   Fall in past 12 months?  No   Fall with injury? -   Number of falls in past 12 months -   Fall Risk Score -

## 2018-10-09 ENCOUNTER — TELEPHONE (OUTPATIENT)
Dept: INTERNAL MEDICINE CLINIC | Age: 68
End: 2018-10-09

## 2018-10-31 NOTE — PERIOP NOTES
[address] [place] [address] Dr Murray Preprocedure Instructions 1. On the day of your surgery, please report to registration located on the 2nd floor of the  [facility]. yes 2. You must have a responsible adult to drive you to the hospital, stay at the hospital during your procedure and drive you home. If they leave your procedure will not be started (no exceptions). yes 3. Do not have anything to eat or drink (including water, gum, mints, coffee, and juice) after midnight. This does not apply to the medications you were instructed to take by your physician. yesIf you are currently taking Plavix, Coumadin, Aspirin, or other blood-thinning agents, contact your physician for special instructions. yes, 
 
4. If you are having a procedure that requires bowel prep: We recommend that you have only clear liquids the day before your procedure and begin your bowel prep by 5:00 pm.  You may continue to drink clear liquids until midnight. If for any reason you are not able to complete your prep please notify your physician immediately. yes 5. Have a list of all current medications, including vitamins, herbal supplements and any other over the counter medications. yes 6. If you wear glasses, contacts, dentures and/or hearing aids, they may be removed prior to procedure, please bring a case to store them in. yes 7. You should understand that if you do not follow these instructions your procedure may be cancelled. If your physical condition changes (I.e. fever, cold or flu) please contact your doctor as soon as possible. 8. It is important that you be on time. If for any reason you are unable to keep your appointment please call [phone] the day of or your physicians office prior to your scheduled procedure

## 2018-11-01 ENCOUNTER — ANESTHESIA EVENT (OUTPATIENT)
Dept: ENDOSCOPY | Age: 68
End: 2018-11-01
Payer: MEDICARE

## 2018-11-01 ENCOUNTER — HOSPITAL ENCOUNTER (OUTPATIENT)
Age: 68
Setting detail: OUTPATIENT SURGERY
Discharge: HOME | End: 2018-11-01
Attending: INTERNAL MEDICINE | Admitting: INTERNAL MEDICINE
Payer: MEDICARE

## 2018-11-01 ENCOUNTER — ANESTHESIA (OUTPATIENT)
Dept: ENDOSCOPY | Age: 68
End: 2018-11-01
Payer: MEDICARE

## 2018-11-01 VITALS
TEMPERATURE: 97.6 F | HEIGHT: 68 IN | SYSTOLIC BLOOD PRESSURE: 133 MMHG | RESPIRATION RATE: 16 BRPM | DIASTOLIC BLOOD PRESSURE: 68 MMHG | WEIGHT: 227 LBS | BODY MASS INDEX: 34.4 KG/M2 | OXYGEN SATURATION: 100 % | HEART RATE: 47 BPM

## 2018-11-01 PROCEDURE — 74011250636 HC RX REV CODE- 250/636

## 2018-11-01 PROCEDURE — 88305 TISSUE EXAM BY PATHOLOGIST: CPT | Performed by: INTERNAL MEDICINE

## 2018-11-01 PROCEDURE — 74011250636 HC RX REV CODE- 250/636: Performed by: INTERNAL MEDICINE

## 2018-11-01 PROCEDURE — 77030013992 HC SNR POLYP ENDOSC BSC -B: Performed by: INTERNAL MEDICINE

## 2018-11-01 PROCEDURE — 76040000019: Performed by: INTERNAL MEDICINE

## 2018-11-01 PROCEDURE — 76060000031 HC ANESTHESIA FIRST 0.5 HR: Performed by: INTERNAL MEDICINE

## 2018-11-01 RX ORDER — ATROPINE SULFATE 0.1 MG/ML
0.4 INJECTION INTRAVENOUS
Status: DISCONTINUED | OUTPATIENT
Start: 2018-11-01 | End: 2018-11-01 | Stop reason: HOSPADM

## 2018-11-01 RX ORDER — SODIUM CHLORIDE 9 MG/ML
50 INJECTION, SOLUTION INTRAVENOUS CONTINUOUS
Status: DISCONTINUED | OUTPATIENT
Start: 2018-11-01 | End: 2018-11-01 | Stop reason: HOSPADM

## 2018-11-01 RX ORDER — PROPOFOL 10 MG/ML
INJECTION, EMULSION INTRAVENOUS AS NEEDED
Status: DISCONTINUED | OUTPATIENT
Start: 2018-11-01 | End: 2018-11-01 | Stop reason: HOSPADM

## 2018-11-01 RX ORDER — NALOXONE HYDROCHLORIDE 0.4 MG/ML
0.4 INJECTION, SOLUTION INTRAMUSCULAR; INTRAVENOUS; SUBCUTANEOUS
Status: DISCONTINUED | OUTPATIENT
Start: 2018-11-01 | End: 2018-11-01 | Stop reason: HOSPADM

## 2018-11-01 RX ORDER — DEXTROMETHORPHAN/PSEUDOEPHED 2.5-7.5/.8
1.2 DROPS ORAL
Status: DISCONTINUED | OUTPATIENT
Start: 2018-11-01 | End: 2018-11-01 | Stop reason: HOSPADM

## 2018-11-01 RX ORDER — EPINEPHRINE 0.1 MG/ML
1 INJECTION INTRACARDIAC; INTRAVENOUS
Status: DISCONTINUED | OUTPATIENT
Start: 2018-11-01 | End: 2018-11-01 | Stop reason: HOSPADM

## 2018-11-01 RX ORDER — MIDAZOLAM HYDROCHLORIDE 1 MG/ML
.25-5 INJECTION, SOLUTION INTRAMUSCULAR; INTRAVENOUS
Status: DISCONTINUED | OUTPATIENT
Start: 2018-11-01 | End: 2018-11-01 | Stop reason: HOSPADM

## 2018-11-01 RX ORDER — PROPOFOL 10 MG/ML
INJECTION, EMULSION INTRAVENOUS
Status: DISCONTINUED | OUTPATIENT
Start: 2018-11-01 | End: 2018-11-01 | Stop reason: HOSPADM

## 2018-11-01 RX ORDER — FLUMAZENIL 0.1 MG/ML
0.2 INJECTION INTRAVENOUS
Status: DISCONTINUED | OUTPATIENT
Start: 2018-11-01 | End: 2018-11-01 | Stop reason: HOSPADM

## 2018-11-01 RX ADMIN — PROPOFOL 20 MG: 10 INJECTION, EMULSION INTRAVENOUS at 09:05

## 2018-11-01 RX ADMIN — PROPOFOL 30 MG: 10 INJECTION, EMULSION INTRAVENOUS at 08:54

## 2018-11-01 RX ADMIN — SODIUM CHLORIDE 50 ML/HR: 900 INJECTION, SOLUTION INTRAVENOUS at 07:59

## 2018-11-01 RX ADMIN — PROPOFOL 100 MCG/KG/MIN: 10 INJECTION, EMULSION INTRAVENOUS at 08:53

## 2018-11-01 RX ADMIN — PROPOFOL 30 MG: 10 INJECTION, EMULSION INTRAVENOUS at 09:04

## 2018-11-01 RX ADMIN — PROPOFOL 20 MG: 10 INJECTION, EMULSION INTRAVENOUS at 08:56

## 2018-11-01 NOTE — H&P
Hieu Eisenberg M.D. 
[phone] History and Physical    
 
NAME:  Carlotta Page :   1950 MRN:   [id number] Referring Physician:  Dr. Angeline Mcneil Consult Date: 2018 8:33 AM 
 
Chief Complaint:  Colon cancer screening History of Present Illness:  Patient is a 79 y.o. who is seen for colon cancer screening. Denies any ongoing GI complaints. PMH: 
Past Medical History:  
Diagnosis Date  Arthritis ALL OVER  
 GERD (gastroesophageal reflux disease)  Gout   
 HTN (hypertension)  Hypercholesterolemia  Nephrolithiasis  Neuropathy  Polyclonal gammopathy  Pseudotumor (inflammatory) of orbit  Stroke ([facility] [address] TIA  TIA (transient ischemic attack) complicated migraine  Urosepsis PSH: 
Past Surgical History:  
Procedure Laterality Date  HX CATARACT REMOVAL Bilateral 2017  HX COLONOSCOPY  2008  HX KNEE REPLACEMENT Right  HX LAP CHOLECYSTECTOMY  HX ORTHOPAEDIC Bilateral   
 BILATERAL HIP REPLACEMENTS  
 HX ORTHOPAEDIC    
 SPUR RIGHT FOOT  
 HX LYLE AND BSO  HX UROLOGICAL  2012  
 cysto for kidney stones (was septic) Allergies: Allergies Allergen Reactions  Percocet [Oxycodone-Acetaminophen] Unknown (comments) Sharp pain in neck-headache Home Medications: 
Prior to Admission Medications Prescriptions Last Dose Informant Patient Reported? Taking?  
aspirin delayed-release 81 mg tablet 10/30/2018 at pm  Yes Yes Sig: Take  by mouth daily. atenolol-chlorthalidone (TENORETIC) 50-25 mg per tablet 2018 at 0230  No Yes Sig: TAKE ONE TABLET BY MOUTH ONCE DAILY  
lisinopril (PRINIVIL, ZESTRIL) 40 mg tablet 2018 at 0230  No Yes Sig: TAKE ONE TABLET BY MOUTH ONCE DAILY  
simvastatin (ZOCOR) 10 mg tablet 10/30/2018 at pm  No Yes Sig: TAKE ONE TABLET BY MOUTH ONCE DAILY AT BEDTIME Facility-Administered Medications: None Hospital Medications: Current Facility-Administered Medications Medication Dose Route Frequency  0.9% sodium chloride infusion  50 mL/hr IntraVENous CONTINUOUS  
 midazolam (VERSED) injection 0.25-5 mg  0.25-5 mg IntraVENous Multiple  naloxone (NARCAN) injection 0.4 mg  0.4 mg IntraVENous Multiple  flumazenil (ROMAZICON) 0.1 mg/mL injection 0.2 mg  0.2 mg IntraVENous Multiple  simethicone (MYLICON) 66MO/3.2AP oral drops 80 mg  1.2 mL Oral Multiple  atropine injection 0.4 mg  0.4 mg IntraVENous ONCE PRN  
 EPINEPHrine (ADRENALIN) 0.1 mg/mL syringe 1 mg  1 mg Endoscopically ONCE PRN Social History: 
Social History Tobacco Use  Smoking status: Current Every Day Smoker Packs/day: 0.25 Years: 20.00 Pack years: 5.00  Smokeless tobacco: Never Used Substance Use Topics  Alcohol use: Yes Alcohol/week: 0.5 oz Types: 1 Glasses of wine per week Family History: 
Family History Problem Relation Age of Onset  Hypertension Mother  Stroke Mother Review of Systems: 
 
 
Constitutional: negative fever, negative chills, negative weight loss Eyes:   negative visual changes ENT:   negative sore throat, tongue or lip swelling Respiratory:  negative cough, negative dyspnea Cards:  negative for chest pain, palpitations, lower extremity edema GI:   See HPI 
:  negative for frequency, dysuria Integument:  negative for rash and pruritus Heme:  negative for easy bruising and gum/nose bleeding Musculoskel: negative for myalgias,  back pain and muscle weakness Neuro: negative for headaches, dizziness, vertigo Psych:  negative for feelings of anxiety, depression Objective:  
 
Patient Vitals for the past 8 hrs: 
 BP Temp Pulse Resp SpO2 Height Weight 11/01/18 0745 142/59 97.9 °F (36.6 °C) (!) 46 19 100 % 5' 8\" (1.727 m) 103 kg (227 lb) No intake/output data recorded. No intake/output data recorded. EXAM:   
 NEURO-a&o HEENT-wnl LUNGS-clear COR-regular rate and rhythym ABD-soft , no tenderness, no rebound, good bs EXT-no edema Data Review No results for input(s): WBC, HGB, HCT, PLT, HGBEXT, HCTEXT, PLTEXT in the last 72 hours. No results for input(s): NA, K, CL, CO2, BUN, CREA, GLU, PHOS, CA in the last 72 hours. No results for input(s): SGOT, GPT, AP, TBIL, TP, ALB, GLOB, GGT, AML, LPSE in the last 72 hours. No lab exists for component: AMYP, HLPSE No results for input(s): INR, PTP, APTT in the last 72 hours. No lab exists for component: INREXT Patient Active Problem List  
Diagnosis Code  Nephrolithiasis N20.0  Urosepsis A41.9, N39.0  Pseudotumor (inflammatory) of orbit H05.119  
 S/P prosthetic total arthroplasty of the hip Z96.649  Essential hypertension I10  Pure hypercholesterolemia E78.00  Cortical age-related cataract of both eyes H25.013 Assessment:  
· Colon cancer screening Plan:  
· Colonoscopy today.   
 
Signed By: Judy Barnhart MD   
 11/1/2018  8:33 AM

## 2018-11-01 NOTE — DISCHARGE INSTRUCTIONS
[address] [facility]. Petrona Saucedo M.D.  [phone]            COLON DISCHARGE INSTRUCTIONS       2018    Jeffrey Weiss  :  1950  [place] Medical Record Number:  [id number]      COLONOSCOPY FINDINGS:  Your colonoscopy showed two diminutive polyps that were removed and sent to pathology, mild sigmoid diverticulosis and small internal hemorrhoids. DISCOMFORT:  Redness at IV site- apply warm compress to area; if redness or soreness persist- contact your physician  There may be a slight amount of blood passed from the rectum  Gaseous discomfort- walking, belching will help relieve any discomfort  You may not operate a vehicle for 12 hours  You may not engage in an occupation involving machinery or appliances for rest of today  You may not drink alcoholic beverages for at least 12 hours  Avoid making any critical decisions for at least 24 hour  DIET:   High fiber diet. - however -  remember your colon is empty and a heavy meal will produce gas. Avoid these foods:  vegetables, fried / greasy foods, carbonated drinks for today     ACTIVITY:  You may resume your normal daily activities it is recommended that you spend the remainder of the day resting -  avoid any strenuous activity. CALL M.D. ANY SIGN OF:   Increasing pain, nausea, vomiting  Abdominal distension (swelling)  New increased bleeding (oral or rectal)  Fever (chills)  Pain in chest area  Bloody discharge from nose or mouth   Shortness of breath    Follow-up Instructions:   Call Dr. Jimbo Vital if any questions or problems. Telephone # [phone]  Biopsy results will be available in  5 to 7 days  Should have a repeat colonoscopy in 5 years if any adenoma is found on pathology, 10 years if polyps are hyperplastic.

## 2018-11-01 NOTE — PERIOP NOTES
4609 Procedure being performed under MAC; MJ Vega Apo  at bedside monitoring patient. See anesthesia notes. 5100 Endoscope was pre-cleaned at bedside immediately following procedure by Miriam Jama. 1038 Patient received Propofol, per MJ Vega Apo. Care of patient assumed from the anesthesia provider. Patient tolerated procedure well. Abdomen remains soft and non tender post procedure, no complaints or indication of discomfort noted at this time. See anesthesia note. Patient transferred to Endoscopy Recovery and report given to recovery nurse.

## 2018-11-01 NOTE — ROUTINE PROCESS
Gonzalez Herrera 1950 
[id number] Situation: 
Verbal report received from: [facility] Procedure: Procedure(s): 
COLONOSCOPY 
ENDOSCOPIC POLYPECTOMY Background: 
 
Preoperative diagnosis: SCREENING Postoperative diagnosis: Sigmoid polyp removed one by hot snare and one by cold snare Diverticulosis Hemorrhoids :  Dr. Preet Young Assistant(s): Endoscopy Technician-1: Flower Grant Endoscopy RN-1: Sonia Jenkins RN Specimens:  
ID Type Source Tests Collected by Time Destination 1 : polyps Preservative Sigmoid  Gladys Dominique MD 11/1/2018 7454 Pathology H. Pylori  no Assessment: 
Intra-procedure medications Anesthesia gave intra-procedure sedation and medications, see anesthesia flow sheet yes Intravenous fluids: NS@ Victor Manueljustin Lyles Vital signs stable Abdominal assessment: round and soft Recommendation: 
Discharge patient per MD order. Family or Friend Permission to share finding with family or friend yes

## 2018-11-01 NOTE — ANESTHESIA POSTPROCEDURE EVALUATION
Procedure(s): 
COLONOSCOPY 
ENDOSCOPIC POLYPECTOMY. Anesthesia Post Evaluation Multimodal analgesia: multimodal analgesia used between 6 hours prior to anesthesia start to PACU discharge Patient location during evaluation: bedside Patient participation: complete - patient participated Level of consciousness: awake Pain management: adequate Airway patency: patent Anesthetic complications: no 
Cardiovascular status: acceptable Respiratory status: acceptable Hydration status: acceptable Visit Vitals /68 Pulse (!) 47 Temp 36.4 °C (97.6 °F) Resp 16 Ht 5' 8\" (1.727 m) Wt 103 kg (227 lb) SpO2 100% Breastfeeding? No  
BMI 34.52 kg/m²

## 2018-11-01 NOTE — PROCEDURES
Eder Darby M.D.  [phone]            2018          Colonoscopy Operative Report  Belén Tavares  :  1950  [facility] Medical Record Number:  [id number]      Indications:    Screening colonoscopy     :  Yesenia Jha MD    Referring Provider: Alissa Arthur PA-C    Sedation:  MAC anesthesia    Pre-Procedural Exam:      Airway: clear,  No airway problems anticipated  Heart: RRR, without gallops or rubs  Lungs: clear bilaterally without wheezes, crackles, or rhonchi  Abdomen: soft, nontender, nondistended, bowel sounds present  Mental Status: awake, alert and oriented to person, place and time     Procedure Details:  After informed consent was obtained with all risks and benefits of procedure explained and preoperative exam completed, the patient was taken to the endoscopy suite and placed in the left lateral decubitus position. Upon sequential sedation as per above, a digital rectal exam was performed. The Olympus videocolonoscope  was inserted in the rectum and carefully advanced to the cecum, which was identified by the ileocecal valve and appendiceal orifice. The quality of preparation was good. The colonoscope was slowly withdrawn with careful inspection and evaluation between folds. Retroflexion in the rectum was performed. Findings:   Terminal Ileum: not intubated  Cecum: normal  Ascending Colon: normal  Transverse Colon: normal  Descending Colon: no mucosal lesion appreciated  mild diverticulosis; Sigmoid: 2  Sessile polyp(s), the largest 3 mm in size;moderate diverticulosis  Rectum: no mucosal lesion appreciated  Grade 1 internal hemorrhoid(s); Interventions:  2 complete polypectomies were performed using cold and hot snare  and the polyps were  retrieved    Specimen Removed:  specimen #1, 3 mm in size, located in the sigmoid removed by snare cautery and retrieved for pathology    Complications: None. EBL:  None.     Impression:  Two diminutive polyps removed and sent to pathology, otherwise mucosa within normal                         Sigmoid diverticulosis and internal hemorrhoids    Recommendations:  -If adenoma is present, repeat colonoscopy in 5 years.   -High fiber diet.    -Resume normal medication(s). Discharge Disposition:  Home in the company of a  when able to ambulate.     Chaz Wang MD  11/1/2018  9:16 AM

## 2018-11-01 NOTE — ANESTHESIA PREPROCEDURE EVALUATION
Anesthetic History No history of anesthetic complications Review of Systems / Medical History Patient summary reviewed and pertinent labs reviewed Pulmonary Smoker Neuro/Psych  
 
 
 
TIA Cardiovascular Hypertension: well controlled Hyperlipidemia Exercise tolerance: >4 METS 
  
GI/Hepatic/Renal 
  
GERD (food related) Renal disease: stones Endo/Other Arthritis Other Findings Comments: Gout 
neuropathy Physical Exam 
 
Airway Mallampati: II 
TM Distance: > 6 cm Neck ROM: normal range of motion Mouth opening: Normal 
 
 Cardiovascular Rhythm: regular Rate: normal 
 
 
 
 Dental 
 
Dentition: Full upper dentures and Full lower dentures Pulmonary Breath sounds clear to auscultation Abdominal 
 
 
 
 Other Findings Anesthetic Plan ASA: 2 Anesthesia type: MAC Induction: Intravenous Anesthetic plan and risks discussed with: Patient

## 2018-11-13 ENCOUNTER — OFFICE VISIT (OUTPATIENT)
Dept: INTERNAL MEDICINE CLINIC | Age: 68
End: 2018-11-13

## 2018-11-13 VITALS
HEART RATE: 54 BPM | WEIGHT: 226 LBS | TEMPERATURE: 97.8 F | DIASTOLIC BLOOD PRESSURE: 73 MMHG | SYSTOLIC BLOOD PRESSURE: 134 MMHG | HEIGHT: 68 IN | OXYGEN SATURATION: 98 % | BODY MASS INDEX: 34.25 KG/M2 | RESPIRATION RATE: 18 BRPM

## 2018-11-13 DIAGNOSIS — R53.83 FATIGUE, UNSPECIFIED TYPE: ICD-10-CM

## 2018-11-13 DIAGNOSIS — E78.00 PURE HYPERCHOLESTEROLEMIA: ICD-10-CM

## 2018-11-13 DIAGNOSIS — Z11.59 ENCOUNTER FOR HEPATITIS C SCREENING TEST FOR LOW RISK PATIENT: ICD-10-CM

## 2018-11-13 DIAGNOSIS — I10 ESSENTIAL HYPERTENSION: Primary | ICD-10-CM

## 2018-11-13 DIAGNOSIS — Z23 ENCOUNTER FOR IMMUNIZATION: ICD-10-CM

## 2018-11-13 NOTE — PROGRESS NOTES
Chief Complaint Patient presents with  Hypertension  
  follow up  Cholesterol Problem  
  follow up 1. Have you been to the ER, urgent care clinic since your last visit? Hospitalized since your last visit? No 
 
2. Have you seen or consulted any other health care providers outside of the [address] [place] [address] since your last visit? Include any pap smears or colon screening. No  
 not fasting Abuse Screening Questionnaire 7/12/2018 Do you ever feel afraid of your partner? Goldie Cordova Are you in a relationship with someone who physically or mentally threatens you? Goldie Cordova Is it safe for you to go home? Mi Ellis Fall Risk Assessment, last 12 mths 11/13/2018 Able to walk? Yes Fall in past 12 months? No  
Fall with injury? -  
Number of falls in past 12 months - Fall Risk Score -  
 
 
 
 
 
 
PHQ over the last two weeks 11/13/2018 Little interest or pleasure in doing things Not at all Feeling down, depressed, irritable, or hopeless Not at all Total Score PHQ 2 0

## 2018-11-13 NOTE — PROGRESS NOTES
HISTORY OF PRESENT ILLNESS Jesu Lewis is a 79 y.o. female. Chief Complaint Patient presents with  Hypertension  
  follow up  Cholesterol Problem  
  follow up Health Maintenance Due Topic Date Due  
 Hepatitis C Screening  1950  Shingrix Vaccine Age 50> (1 of 2) 07/21/2002  Influenza Age 5 to Adult  08/01/2018  Pneumococcal 65+ Low/Medium Risk (2 of 2 - PPSV23) 10/16/2018 Influenza: will get this done today. HPI 
 
HTN - well controlled. No s/e. Fatigued recently with stress with 's health. Restarted smoking. Planning to quit. Planning to start exercising again for weight loss. Wt Readings from Last 9 Encounters:  
11/13/18 226 lb (102.5 kg) 11/01/18 227 lb (103 kg) 10/08/18 228 lb (103.4 kg) 07/12/18 225 lb (102.1 kg) 10/24/17 233 lb (105.7 kg) 10/10/17 231 lb 9.6 oz (105.1 kg) 07/25/17 236 lb (107 kg) 01/19/17 225 lb (102.1 kg) 07/26/16 244 lb 3.2 oz (110.8 kg) Weight has decreased over the past 2 years. Due for fasting labs. Review of Systems Constitutional: Positive for malaise/fatigue. Respiratory: Negative for shortness of breath. Cardiovascular: Negative for chest pain and palpitations. Neurological: Negative for dizziness, loss of consciousness and weakness. Psychiatric/Behavioral: Negative for depression. The patient is nervous/anxious. Physical Exam  
Constitutional: She is oriented to person, place, and time. She appears well-developed and well-nourished. No distress. HENT:  
Head: Normocephalic and atraumatic. Neck: Neck supple. No JVD present. Cardiovascular: Normal rate, regular rhythm and normal heart sounds. Pulmonary/Chest: Effort normal and breath sounds normal. No respiratory distress. Musculoskeletal: She exhibits no edema. Neurological: She is alert and oriented to person, place, and time. Skin: Skin is warm and dry. Psychiatric: She has a normal mood and affect. Her behavior is normal. Judgment and thought content normal.  
Nursing note and vitals reviewed. ASSESSMENT and PLAN 
  ICD-10-CM ICD-9-CM 1. Essential hypertension I10 401.9 Controlled METABOLIC PANEL, COMPREHENSIVE 2. Pure hypercholesterolemia E78.00 272.0 LIPID PANEL 3. Encounter for immunization Z23 V03.89 INFLUENZA VACCINE INACTIVATED (IIV), SUBUNIT, ADJUVANTED, IM  
   varicella-zoster recombinant, PF, (SHINGRIX, PF,) 50 mcg/0.5 mL susr injection  
   pneumococcal 23-valent (PNEUMOVAX 23) 25 mcg/0.5 mL injection 4. Fatigue, unspecified type R53.83 780.79 CBC WITH AUTOMATED DIFF  
   TSH RFX ON ABNORMAL TO FREE T4  
5.  Encounter for hepatitis C screening test for low risk patient Z11.59 V73.89 HCV AB W/RFLX TO NIMA

## 2018-11-13 NOTE — LETTER
11/15/2018 3:42 PM 
 
Ms. Carlos Show 2003 [id number] [phone] Dear Breanna Show: 
 
Please find your most recent results below. Resulted Orders METABOLIC PANEL, COMPREHENSIVE Result Value Ref Range Glucose 90 65 - 99 mg/dL BUN 17 8 - 27 mg/dL Creatinine 1.01 (H) 0.57 - 1.00 mg/dL GFR est non-AA 58 (L) >59 mL/min/1.73 GFR est AA 67 >59 mL/min/1.73  
 BUN/Creatinine ratio 17 12 - 28 Sodium 142 134 - 144 mmol/L Potassium 4.1 3.5 - 5.2 mmol/L Chloride 105 96 - 106 mmol/L  
 CO2 23 20 - 29 mmol/L Calcium 9.4 8.7 - 10.3 mg/dL Protein, total 6.9 6.0 - 8.5 g/dL Albumin 3.9 3.6 - 4.8 g/dL GLOBULIN, TOTAL 3.0 1.5 - 4.5 g/dL A-G Ratio 1.3 1.2 - 2.2 Bilirubin, total 0.3 0.0 - 1.2 mg/dL Alk. phosphatase 86 39 - 117 IU/L  
 AST (SGOT) 19 0 - 40 IU/L  
 ALT (SGPT) 14 0 - 32 IU/L Narrative Performed at:  [address]  [id number] : Alfredo Wheeler MD, Phone:  [phone] CBC WITH AUTOMATED DIFF Result Value Ref Range WBC 6.9 3.4 - 10.8 x10E3/uL  
 RBC 4.04 3.77 - 5.28 x10E6/uL HGB 12.3 11.1 - 15.9 g/dL HCT 37.5 34.0 - 46.6 % MCV 93 79 - 97 fL  
 MCH 30.4 26.6 - 33.0 pg  
 MCHC 32.8 31.5 - 35.7 g/dL  
 RDW 13.8 12.3 - 15.4 % PLATELET 332 857 - 976 x10E3/uL NEUTROPHILS 48 Not Estab. % Lymphocytes 35 Not Estab. % MONOCYTES 7 Not Estab. % EOSINOPHILS 10 Not Estab. % BASOPHILS 0 Not Estab. %  
 ABS. NEUTROPHILS 3.4 1.4 - 7.0 x10E3/uL Abs Lymphocytes 2.4 0.7 - 3.1 x10E3/uL  
 ABS. MONOCYTES 0.5 0.1 - 0.9 x10E3/uL  
 ABS. EOSINOPHILS 0.7 (H) 0.0 - 0.4 x10E3/uL  
 ABS. BASOPHILS 0.0 0.0 - 0.2 x10E3/uL IMMATURE GRANULOCYTES 0 Not Estab. %  
 ABS. IMM. GRANS. 0.0 0.0 - 0.1 x10E3/uL Narrative Performed at:  [address]  [id number] : Alfredo Wheeler MD, Phone:  [phone] LIPID PANEL  
 Result Value Ref Range Cholesterol, total 114 100 - 199 mg/dL Triglyceride 90 0 - 149 mg/dL HDL Cholesterol 38 (L) >39 mg/dL VLDL, calculated 18 5 - 40 mg/dL LDL, calculated 58 0 - 99 mg/dL Narrative Performed at:  [address]  [id number] : Rojean Koyanagi MD, Phone:  [phone] TSH RFX ON ABNORMAL TO FREE T4 Result Value Ref Range TSH 1.670 0.450 - 4.500 uIU/mL Narrative Performed at:  [address]  [id number] : Rojean Koyanagi MD, Phone:  [phone] HCV AB W/RFLX TO NIMA Result Value Ref Range HCV Ab <0.1 0.0 - 0.9 s/co ratio Narrative Performed at:  [address]  [id number] : Rojean Koyanagi MD, Phone:  [phone] CVD REPORT Result Value Ref Range INTERPRETATION Note Comment:  
   Supplemental report is available. PDF IMAGE Not applicable Narrative Performed at:  [address] [place] [address] [address] [place] [address]  [id number] : Kishore Esparza MD, Phone:  [phone] CKD REPORT Result Value Ref Range Interpretation Note Comment:  
   Supplemental report is available. Narrative Performed at:  [address] [place] [address] [address] [place] [address]  [id number] : Kishore Esparza MD, Phone:  [phone] INTERPRETATION Result Value Ref Range HCV Interpretation Comment Comment:  
   Negative Not infected with HCV, unless recent infection is suspected or other 
evidence exists to indicate HCV infection. Narrative Performed at:  [address]  [id number] : Rojean Koyanagi MD, Phone:  [phone] RECOMMENDATIONS: 
Blood Count, thyroid, liver enzymes, and electrolytes are all normal. Hepatitis C test is negative (normal), as expected. Good! Your HDL (\"good cholesterol\") is low. You can improve this with cardio exercise, eating fatty fish (like salmon), whole grains, nuts, and using olive oil instead of butter whenever possible. Your other cholesterol readings are very good. Kidney function is very slightly high - probably due to dehydration since you were fasting for this lab. Please hydrate until your urine is fairly clear before all future blood draws to ensure accurate kidney testing going forward. Please call me if you have any questions: [phone] Sincerely, Talha Snow PA-C

## 2018-11-14 ENCOUNTER — HOSPITAL ENCOUNTER (OUTPATIENT)
Dept: LAB | Age: 68
Discharge: HOME | End: 2018-11-14
Payer: MEDICARE

## 2018-11-14 PROCEDURE — 80061 LIPID PANEL: CPT

## 2018-11-14 PROCEDURE — 85025 COMPLETE CBC W/AUTO DIFF WBC: CPT

## 2018-11-14 PROCEDURE — 84443 ASSAY THYROID STIM HORMONE: CPT

## 2018-11-14 PROCEDURE — 86803 HEPATITIS C AB TEST: CPT

## 2018-11-14 PROCEDURE — 80053 COMPREHEN METABOLIC PANEL: CPT

## 2018-11-14 PROCEDURE — 36415 COLL VENOUS BLD VENIPUNCTURE: CPT

## 2018-11-15 LAB
ALBUMIN SERPL-MCNC: 3.9 G/DL (ref 3.6–4.8)
ALBUMIN/GLOB SERPL: 1.3 {RATIO} (ref 1.2–2.2)
ALP SERPL-CCNC: 86 IU/L (ref 39–117)
ALT SERPL-CCNC: 14 IU/L (ref 0–32)
AST SERPL-CCNC: 19 IU/L (ref 0–40)
BASOPHILS # BLD AUTO: 0 X10E3/UL (ref 0–0.2)
BASOPHILS NFR BLD AUTO: 0 %
BILIRUB SERPL-MCNC: 0.3 MG/DL (ref 0–1.2)
BUN SERPL-MCNC: 17 MG/DL (ref 8–27)
BUN/CREAT SERPL: 17 (ref 12–28)
CALCIUM SERPL-MCNC: 9.4 MG/DL (ref 8.7–10.3)
CHLORIDE SERPL-SCNC: 105 MMOL/L (ref 96–106)
CHOLEST SERPL-MCNC: 114 MG/DL (ref 100–199)
CO2 SERPL-SCNC: 23 MMOL/L (ref 20–29)
CREAT SERPL-MCNC: 1.01 MG/DL (ref 0.57–1)
EOSINOPHIL # BLD AUTO: 0.7 X10E3/UL (ref 0–0.4)
EOSINOPHIL NFR BLD AUTO: 10 %
ERYTHROCYTE [DISTWIDTH] IN BLOOD BY AUTOMATED COUNT: 13.8 % (ref 12.3–15.4)
GLOBULIN SER CALC-MCNC: 3 G/DL (ref 1.5–4.5)
GLUCOSE SERPL-MCNC: 90 MG/DL (ref 65–99)
HCT VFR BLD AUTO: 37.5 % (ref 34–46.6)
HCV AB S/CO SERPL IA: <0.1 S/CO RATIO (ref 0–0.9)
HCV AB SERPL QL IA: NORMAL
HDLC SERPL-MCNC: 38 MG/DL
HGB BLD-MCNC: 12.3 G/DL (ref 11.1–15.9)
IMM GRANULOCYTES # BLD: 0 X10E3/UL (ref 0–0.1)
IMM GRANULOCYTES NFR BLD: 0 %
INTERPRETATION, 910389: NORMAL
INTERPRETATION: NORMAL
LDLC SERPL CALC-MCNC: 58 MG/DL (ref 0–99)
LYMPHOCYTES # BLD AUTO: 2.4 X10E3/UL (ref 0.7–3.1)
LYMPHOCYTES NFR BLD AUTO: 35 %
MCH RBC QN AUTO: 30.4 PG (ref 26.6–33)
MCHC RBC AUTO-ENTMCNC: 32.8 G/DL (ref 31.5–35.7)
MCV RBC AUTO: 93 FL (ref 79–97)
MONOCYTES # BLD AUTO: 0.5 X10E3/UL (ref 0.1–0.9)
MONOCYTES NFR BLD AUTO: 7 %
NEUTROPHILS # BLD AUTO: 3.4 X10E3/UL (ref 1.4–7)
NEUTROPHILS NFR BLD AUTO: 48 %
PDF IMAGE, 910387: NORMAL
PLATELET # BLD AUTO: 195 X10E3/UL (ref 150–379)
POTASSIUM SERPL-SCNC: 4.1 MMOL/L (ref 3.5–5.2)
PROT SERPL-MCNC: 6.9 G/DL (ref 6–8.5)
RBC # BLD AUTO: 4.04 X10E6/UL (ref 3.77–5.28)
SODIUM SERPL-SCNC: 142 MMOL/L (ref 134–144)
TRIGL SERPL-MCNC: 90 MG/DL (ref 0–149)
TSH SERPL DL<=0.005 MIU/L-ACNC: 1.67 UIU/ML (ref 0.45–4.5)
VLDLC SERPL CALC-MCNC: 18 MG/DL (ref 5–40)
WBC # BLD AUTO: 6.9 X10E3/UL (ref 3.4–10.8)

## 2018-12-18 ENCOUNTER — HOSPITAL ENCOUNTER (OUTPATIENT)
Dept: MAMMOGRAPHY | Age: 68
Discharge: HOME | End: 2018-12-18
Attending: PHYSICIAN ASSISTANT
Payer: MEDICARE

## 2018-12-18 DIAGNOSIS — Z12.31 ENCOUNTER FOR SCREENING MAMMOGRAM FOR BREAST CANCER: ICD-10-CM

## 2018-12-18 PROCEDURE — 77067 SCR MAMMO BI INCL CAD: CPT

## 2019-01-25 ENCOUNTER — HOSPITAL ENCOUNTER (OUTPATIENT)
Dept: LAB | Age: 69
Discharge: HOME | End: 2019-01-25
Payer: MEDICARE

## 2019-01-25 ENCOUNTER — OFFICE VISIT (OUTPATIENT)
Dept: INTERNAL MEDICINE CLINIC | Age: 69
End: 2019-01-25

## 2019-01-25 VITALS
WEIGHT: 227 LBS | OXYGEN SATURATION: 99 % | RESPIRATION RATE: 14 BRPM | HEART RATE: 58 BPM | SYSTOLIC BLOOD PRESSURE: 152 MMHG | TEMPERATURE: 97.4 F | HEIGHT: 68 IN | DIASTOLIC BLOOD PRESSURE: 80 MMHG | BODY MASS INDEX: 34.4 KG/M2

## 2019-01-25 DIAGNOSIS — R31.9 HEMATURIA, UNSPECIFIED TYPE: Primary | ICD-10-CM

## 2019-01-25 LAB
BILIRUB UR QL STRIP: NEGATIVE
GLUCOSE UR-MCNC: NEGATIVE MG/DL
KETONES P FAST UR STRIP-MCNC: NEGATIVE MG/DL
PH UR STRIP: 7 [PH] (ref 4.6–8)
PROT UR QL STRIP: NORMAL
SP GR UR STRIP: 1.02 (ref 1–1.03)
UA UROBILINOGEN AMB POC: NORMAL (ref 0.2–1)
URINALYSIS CLARITY POC: NORMAL
URINALYSIS COLOR POC: YELLOW
URINE BLOOD POC: NORMAL
URINE LEUKOCYTES POC: NORMAL
URINE NITRITES POC: NEGATIVE

## 2019-01-25 PROCEDURE — 87088 URINE BACTERIA CULTURE: CPT

## 2019-01-25 PROCEDURE — 87086 URINE CULTURE/COLONY COUNT: CPT

## 2019-01-25 PROCEDURE — 87186 SC STD MICRODIL/AGAR DIL: CPT

## 2019-01-25 PROCEDURE — 87077 CULTURE AEROBIC IDENTIFY: CPT

## 2019-01-25 RX ORDER — NITROFURANTOIN 25; 75 MG/1; MG/1
100 CAPSULE ORAL 2 TIMES DAILY
Qty: 10 CAP | Refills: 0 | Status: SHIPPED | OUTPATIENT
Start: 2019-01-25 | End: 2019-03-20 | Stop reason: ALTCHOICE

## 2019-01-25 NOTE — PROGRESS NOTES
Susu Amador is a 79 y.o. female    Chief Complaint   Patient presents with    Blood in Urine     x 1 day pt states their was a clot in the toliet after urinating      1. Have you been to the ER, urgent care clinic since your last visit? Hospitalized since your last visit? No    2. Have you seen or consulted any other health care providers outside of the [address] [place] [address] since your last visit? Include any pap smears or colon screening.  No    Visit Vitals  /80 (BP 1 Location: Right arm, BP Patient Position: Sitting)   Pulse (!) 58   Temp 97.4 °F (36.3 °C) (Oral)   Resp 14   Ht 5' 8\" (1.727 m)   Wt 227 lb (103 kg)   SpO2 99%   BMI 34.52 kg/m²       Health Maintenance Due   Topic Date Due    Shingrix Vaccine Age 49> (1 of 2) 07/21/2002    Pneumococcal 65+ Low/Medium Risk (2 of 2 - PPSV23) 10/16/2018

## 2019-01-25 NOTE — PROGRESS NOTES
HISTORY OF PRESENT ILLNESS  Seda Gordon is a 79 y.o. female. HPI  Patient presents to the office for evaluation of blood in her urine. She reports yesterday she went to the bathroom and noticed a blood clot in her urine. She states she also noticed blood in the toilet. She went to the bathroom later that day and noticed just a little blood on the tissue. Today she has not seen blood. She reports she has not had any urinary symptoms. No burning with urination. No frequency. No stomach pain. She does have a history of kidney stones in the past. She reports she had to have lithotripsy. She has not seen the urologist in many years. Review of Systems   Gastrointestinal: Negative for abdominal pain, nausea and vomiting. Genitourinary: Positive for hematuria. Negative for dysuria, flank pain, frequency and urgency. Blood pressure 152/80, pulse (!) 58, temperature 97.4 °F (36.3 °C), temperature source Oral, resp. rate 14, height 5' 8\" (1.727 m), weight 227 lb (103 kg), last menstrual period 09/17/1987, SpO2 99 %. Physical Exam   Constitutional: She appears well-developed and well-nourished. No distress. Abdominal: There is no tenderness. ASSESSMENT and PLAN  Diagnoses and all orders for this visit:    1. Hematuria, unspecified type  -     AMB POC URINALYSIS DIP STICK AUTO W/O MICRO  -     CULTURE, URINE  -     nitrofurantoin, macrocrystal-monohydrate, (MACROBID) 100 mg capsule; Take 1 Cap by mouth two (2) times a day. patient to take the medication as prescribed. She will be contact with the results of the urine culture on Monday once back. If urine culture is negative, she may need consult with urologist. All this was discussed with the patient and she understands and agrees.

## 2019-01-29 LAB
BACTERIA UR CULT: ABNORMAL
BACTERIA UR CULT: ABNORMAL

## 2019-01-29 NOTE — PROGRESS NOTES
Please let the patient know e. Coli grew on culture. The antibiotic sent should cover her infection. She should follow up if not better.  thanks

## 2019-01-30 ENCOUNTER — TELEPHONE (OUTPATIENT)
Dept: INTERNAL MEDICINE CLINIC | Age: 69
End: 2019-01-30

## 2019-01-30 NOTE — TELEPHONE ENCOUNTER
Spoke with patient advised per Dileep Solomon PA-C that e. Coli grew on culture. The antibiotic sent should cover her infection. She should follow up if not better.  Patient verbalized understanding

## 2019-01-30 NOTE — TELEPHONE ENCOUNTER
Pt is returning call to office in regard to test results.     Best Contact: [phone]      khalida

## 2019-03-19 ENCOUNTER — TELEPHONE (OUTPATIENT)
Dept: INTERNAL MEDICINE CLINIC | Age: 69
End: 2019-03-19

## 2019-03-19 NOTE — TELEPHONE ENCOUNTER
Writer took triage call for patient. Patient c/o another blood clot, dk red(size of 1/2 dollar) in her urine with tinge on toilet paper. After reading patient previous OV note with Stefani, this is not new. Writer spoke with Jolynn Baker in regards what the next step is, patient needs appointment to first be tested for UTI, then most likely needs to see Urology, will need a referral, patient does not have a urologist at this time. Appointment made with Aure Samson for 3/20.

## 2019-03-20 ENCOUNTER — TELEPHONE (OUTPATIENT)
Dept: INTERNAL MEDICINE CLINIC | Age: 69
End: 2019-03-20

## 2019-03-20 ENCOUNTER — OFFICE VISIT (OUTPATIENT)
Dept: INTERNAL MEDICINE CLINIC | Age: 69
End: 2019-03-20

## 2019-03-20 ENCOUNTER — HOSPITAL ENCOUNTER (OUTPATIENT)
Dept: LAB | Age: 69
Discharge: HOME | End: 2019-03-20
Payer: MEDICARE

## 2019-03-20 VITALS
HEART RATE: 60 BPM | OXYGEN SATURATION: 99 % | RESPIRATION RATE: 18 BRPM | SYSTOLIC BLOOD PRESSURE: 134 MMHG | HEIGHT: 68 IN | TEMPERATURE: 98 F | DIASTOLIC BLOOD PRESSURE: 80 MMHG | WEIGHT: 230.8 LBS | BODY MASS INDEX: 34.98 KG/M2

## 2019-03-20 DIAGNOSIS — R31.0 GROSS HEMATURIA: Primary | ICD-10-CM

## 2019-03-20 DIAGNOSIS — I10 ESSENTIAL HYPERTENSION: ICD-10-CM

## 2019-03-20 DIAGNOSIS — N30.01 ACUTE CYSTITIS WITH HEMATURIA: ICD-10-CM

## 2019-03-20 PROBLEM — E66.01 SEVERE OBESITY (HCC): Status: ACTIVE | Noted: 2019-03-20

## 2019-03-20 PROCEDURE — 87086 URINE CULTURE/COLONY COUNT: CPT

## 2019-03-20 PROCEDURE — 87186 SC STD MICRODIL/AGAR DIL: CPT

## 2019-03-20 PROCEDURE — 87088 URINE BACTERIA CULTURE: CPT

## 2019-03-20 NOTE — TELEPHONE ENCOUNTER
Pt states that the antibiotic has not been called into [facility] on [address] (information is on file) Pt contact [phone]      [facility]

## 2019-03-20 NOTE — PROGRESS NOTES
Jillian Zaman is a 79 y.o. female  Chief Complaint   Patient presents with    Blood in Urine     blood clot in urine        Health Maintenance Due   Topic Date Due    Shingrix Vaccine Age 49> (1 of 2) 07/21/2002    Pneumococcal 65+ years (1 of 2 - PCV13) 07/21/2017       HPI  Hematuria - treated by Hallie Vergara 1/25/19 with Macrobid. She does have a history of kidney stones in the past. She reports she had to have lithotripsy. She has not seen the urologist in many years. Wt Readings from Last 3 Encounters:   03/20/19 230 lb 12.8 oz (104.7 kg)   01/25/19 227 lb (103 kg)   11/13/18 226 lb (102.5 kg)     BP uncontrolled at first check:   BP Readings from Last 1 Encounters:   03/20/19 154/83     controlled at recheck:  BP Readings from Last 3 Encounters:   03/20/19 134/80   01/25/19 152/80   11/13/18 134/73     Currently taking:   Key CAD CHF Meds             atenolol-chlorthalidone (TENORETIC) 50-25 mg per tablet (Taking) TAKE ONE TABLET BY MOUTH ONCE DAILY    lisinopril (PRINIVIL, ZESTRIL) 40 mg tablet (Taking) TAKE ONE TABLET BY MOUTH ONCE DAILY    simvastatin (ZOCOR) 10 mg tablet (Taking) TAKE ONE TABLET BY MOUTH ONCE DAILY AT BEDTIME    aspirin delayed-release 81 mg tablet (Taking) Take  by mouth daily. Wt Readings from Last 3 Encounters:   03/20/19 230 lb 12.8 oz (104.7 kg)   01/25/19 227 lb (103 kg)   11/13/18 226 lb (102.5 kg)     Review of Systems   Respiratory: Negative for shortness of breath. Cardiovascular: Negative for chest pain and palpitations. Gastrointestinal: Negative for abdominal pain. Genitourinary: Positive for hematuria. Negative for dysuria, flank pain, frequency and urgency. Neurological: Negative for dizziness, loss of consciousness and weakness. Physical Exam   Constitutional: She is oriented to person, place, and time. She appears well-developed and well-nourished. No distress. HENT:   Head: Normocephalic and atraumatic. Neck: Neck supple. No JVD present. No bruit bilateral carotid arteries. Cardiovascular: Normal rate, regular rhythm and normal heart sounds. Pulmonary/Chest: Effort normal and breath sounds normal. No respiratory distress. Genitourinary:   Genitourinary Comments: Bilateral costovertebral angles nontender to palpation. Musculoskeletal: She exhibits no edema. Neurological: She is alert and oriented to person, place, and time. Skin: Skin is warm and dry. Psychiatric: She has a normal mood and affect. Her behavior is normal. Judgment and thought content normal.   Nursing note and vitals reviewed. Diagnoses and all orders for this visit:    1. Gross hematuria  -     AMB POC URINALYSIS DIP STICK AUTO W/O MICRO  -     REFERRAL TO UROLOGY    2. Acute cystitis with hematuria  -     CULTURE, URINE  -     nitrofurantoin, macrocrystal-monohydrate, (MACROBID) 100 mg capsule; Take 1 Cap by mouth two (2) times a day for 7 days. Finish entire course. 3. Essential hypertension  Controlled  Discussed diet/exercise and options for/importance of losing weight.

## 2019-03-20 NOTE — PROGRESS NOTES
1. Have you been to the ER, urgent care clinic since your last visit? Hospitalized since your last visit? No    2. Have you seen or consulted any other health care providers outside of the [address] [place] [address] since your last visit? Include any pap smears or colon screening.  No   Chief Complaint   Patient presents with    Blood in Urine     blood clot in urine     Not fasting

## 2019-03-22 LAB
BACTERIA UR CULT: ABNORMAL
BACTERIA UR CULT: ABNORMAL

## 2019-03-22 RX ORDER — NITROFURANTOIN 25; 75 MG/1; MG/1
100 CAPSULE ORAL 2 TIMES DAILY
Qty: 14 CAP | Refills: 0 | Status: SHIPPED | OUTPATIENT
Start: 2019-03-22 | End: 2019-03-29

## 2019-03-24 DIAGNOSIS — I10 ESSENTIAL HYPERTENSION: ICD-10-CM

## 2019-03-24 DIAGNOSIS — E78.00 PURE HYPERCHOLESTEROLEMIA: ICD-10-CM

## 2019-03-24 RX ORDER — LISINOPRIL 40 MG/1
TABLET ORAL
Qty: 90 TAB | Refills: 1 | Status: SHIPPED | OUTPATIENT
Start: 2019-03-24 | End: 2019-10-19 | Stop reason: SDUPTHER

## 2019-03-24 RX ORDER — ATENOLOL AND CHLORTHALIDONE TABLET 50; 25 MG/1; MG/1
TABLET ORAL
Qty: 90 TAB | Refills: 1 | Status: SHIPPED | OUTPATIENT
Start: 2019-03-24 | End: 2019-10-19 | Stop reason: SDUPTHER

## 2019-03-24 RX ORDER — SIMVASTATIN 10 MG/1
TABLET, FILM COATED ORAL
Qty: 90 TAB | Refills: 1 | Status: SHIPPED | OUTPATIENT
Start: 2019-03-24 | End: 2019-12-18

## 2019-03-25 LAB
BILIRUB UR QL STRIP: NEGATIVE
GLUCOSE UR-MCNC: NEGATIVE MG/DL
KETONES P FAST UR STRIP-MCNC: NEGATIVE MG/DL
PH UR STRIP: 7 [PH] (ref 4.6–8)
PROT UR QL STRIP: NORMAL
SP GR UR STRIP: 1.02 (ref 1–1.03)
UA UROBILINOGEN AMB POC: NORMAL (ref 0.2–1)
URINALYSIS CLARITY POC: CLEAR
URINALYSIS COLOR POC: YELLOW
URINE BLOOD POC: NORMAL
URINE LEUKOCYTES POC: NORMAL
URINE NITRITES POC: NEGATIVE

## 2019-05-28 ENCOUNTER — OFFICE VISIT (OUTPATIENT)
Dept: INTERNAL MEDICINE CLINIC | Age: 69
End: 2019-05-28

## 2019-05-28 VITALS
TEMPERATURE: 97.8 F | SYSTOLIC BLOOD PRESSURE: 127 MMHG | OXYGEN SATURATION: 99 % | RESPIRATION RATE: 16 BRPM | WEIGHT: 222.6 LBS | BODY MASS INDEX: 33.74 KG/M2 | DIASTOLIC BLOOD PRESSURE: 77 MMHG | HEIGHT: 68 IN | HEART RATE: 61 BPM

## 2019-05-28 DIAGNOSIS — N20.0 STAGHORN CALCULUS: Primary | ICD-10-CM

## 2019-05-28 DIAGNOSIS — Z63.8 CAREGIVER ROLE STRAIN: ICD-10-CM

## 2019-05-28 DIAGNOSIS — E66.01 SEVERE OBESITY (HCC): ICD-10-CM

## 2019-05-28 RX ORDER — FLUOXETINE HYDROCHLORIDE 20 MG/1
20 CAPSULE ORAL DAILY
Qty: 30 CAP | Refills: 5 | Status: SHIPPED | OUTPATIENT
Start: 2019-05-28 | End: 2019-12-18

## 2019-05-28 SDOH — SOCIAL STABILITY - SOCIAL INSECURITY: OTHER SPECIFIED PROBLEMS RELATED TO PRIMARY SUPPORT GROUP: Z63.8

## 2019-05-28 NOTE — PROGRESS NOTES
Michelle Walter is a 79 y.o. female  Chief Complaint   Patient presents with    Hypertension     follow up    Cholesterol Problem     follow up      Visit Vitals  /77 (BP 1 Location: Left arm, BP Patient Position: At rest)   Pulse 61   Temp 97.8 °F (36.6 °C) (Oral)   Resp 16   Ht 5' 8\" (1.727 m)   Wt 222 lb 9.6 oz (101 kg)   SpO2 99%   BMI 33.85 kg/m²     Health Maintenance Due   Topic Date Due    Shingrix Vaccine Age 49> (1 of 2) 07/21/2002    Pneumococcal 65+ years (2 of 2 - PPSV23) 10/16/2018       HPI  Seen at [facility] 5/10/19 for removal of R mid and lower pole staghorn calculus with Dr. Gisselle Goodman. R ureteral stent removal and replacement, therapeutic R ureteroscopy, and R percutaneous nephrolithotomy greater than 2.5 cm done. Pt notes she is doing well s/p surgery. She is struggling with depression in her caregiver role for her  who is very sick. She has family support, but is not scheduling time for self care. She is worrying instead of sleeping most nights. Review of Systems   Constitutional: Negative for fever. Respiratory: Negative for shortness of breath. Cardiovascular: Negative for chest pain and palpitations. Skin: Negative for itching and rash. Neurological: Negative for dizziness, loss of consciousness and weakness. Physical Exam   Constitutional: She is oriented to person, place, and time. She appears well-developed and well-nourished. No distress. HENT:   Head: Normocephalic and atraumatic. Neck: Neck supple. No JVD present. No bruit bilateral carotid arteries. Cardiovascular: Normal rate, regular rhythm and normal heart sounds. Pulmonary/Chest: Effort normal and breath sounds normal. No respiratory distress. Musculoskeletal: She exhibits no edema. Neurological: She is alert and oriented to person, place, and time. Skin: Skin is warm and dry. No rash noted. No erythema. Well healing incision noted R mid back.     Psychiatric: She has a normal mood and affect. Her behavior is normal. Judgment and thought content normal.   Appropriately tearful given subject matter during history. Recovers normally. Nursing note and vitals reviewed. Diagnoses and all orders for this visit:    1. Staghorn calculus  Recovering well s/p surgery     2. Caregiver role strain  -  Start   FLUoxetine (PROZAC) 20 mg capsule; Take 1 Cap by mouth daily. Encouraged self care and allowing family members to help as much as they are able. 3. Severe obesity ([address]  Encouraged exercise for both physical and mental health.

## 2019-05-28 NOTE — PROGRESS NOTES
1. Have you been to the ER, urgent care clinic since your last visit? Hospitalized since your last visit? No    2. Have you seen or consulted any other health care providers outside of the [address] [place] [address] since your last visit? Include any pap smears or colon screening.  No   Chief Complaint   Patient presents with    Hypertension     follow up    Cholesterol Problem     follow up     Not fasting

## 2019-06-11 ENCOUNTER — TELEPHONE (OUTPATIENT)
Dept: INTERNAL MEDICINE CLINIC | Age: 69
End: 2019-06-11

## 2019-06-11 NOTE — TELEPHONE ENCOUNTER
Spoke with patient advised per Stephen Sweeney PA-C that is is ok to take half the medication and to try taking it at night if not better call the office back.  Patient verbalized understanding

## 2019-06-11 NOTE — TELEPHONE ENCOUNTER
Patient is calling she is on Prozac 20 mg, pt would like to know if she can take 1/2 tab because she doesn't like the way it makes her feel, please advise [phone]

## 2019-06-11 NOTE — TELEPHONE ENCOUNTER
That's fine. Also, tell pt that she can take it at night. If she's not doing better in 1 week, please ask her to call back.

## 2019-07-09 ENCOUNTER — OFFICE VISIT (OUTPATIENT)
Dept: INTERNAL MEDICINE CLINIC | Age: 69
End: 2019-07-09

## 2019-07-09 VITALS
TEMPERATURE: 97.4 F | HEART RATE: 41 BPM | OXYGEN SATURATION: 99 % | BODY MASS INDEX: 33.92 KG/M2 | HEIGHT: 68 IN | RESPIRATION RATE: 18 BRPM | DIASTOLIC BLOOD PRESSURE: 67 MMHG | SYSTOLIC BLOOD PRESSURE: 134 MMHG | WEIGHT: 223.8 LBS

## 2019-07-09 DIAGNOSIS — I10 ESSENTIAL HYPERTENSION: ICD-10-CM

## 2019-07-09 DIAGNOSIS — E66.01 SEVERE OBESITY (HCC): ICD-10-CM

## 2019-07-09 DIAGNOSIS — Z63.8 CAREGIVER ROLE STRAIN: ICD-10-CM

## 2019-07-09 DIAGNOSIS — Z00.00 MEDICARE ANNUAL WELLNESS VISIT, SUBSEQUENT: Primary | ICD-10-CM

## 2019-07-09 DIAGNOSIS — Z23 ENCOUNTER FOR IMMUNIZATION: ICD-10-CM

## 2019-07-09 SDOH — SOCIAL STABILITY - SOCIAL INSECURITY: OTHER SPECIFIED PROBLEMS RELATED TO PRIMARY SUPPORT GROUP: Z63.8

## 2019-07-09 NOTE — PROGRESS NOTES
1. Have you been to the ER, urgent care clinic since your last visit? Hospitalized since your last visit?no      2. Have you seen or consulted any other health care providers outside of the [address] [place] [address] since your last visit? Include any pap smears or colon screening.  No  Chief Complaint   Patient presents with    Hypertension     follow up    Cholesterol Problem     follow up     Not fasting

## 2019-07-09 NOTE — PROGRESS NOTES
Karen Castano is a 79 y.o. female  Chief Complaint   Patient presents with    Hypertension     follow up    Cholesterol Problem     follow up      Visit Vitals  /67 (BP 1 Location: Left arm, BP Patient Position: At rest)   Pulse (!) 41   Temp 97.4 °F (36.3 °C) (Oral)   Resp 18   Ht 5' 8\" (1.727 m)   Wt 223 lb 12.8 oz (101.5 kg)   SpO2 99%   BMI 34.03 kg/m²     Health Maintenance Due   Topic Date Due    Shingrix Vaccine Age 49> (1 of 2) 07/21/2002    Pneumococcal 65+ years (2 of 2 - PPSV23) 10/16/2018    MEDICARE YEARLY EXAM  07/13/2019       HPI  HTN Follow up - BP controlled:  BP Readings from Last 3 Encounters:   07/09/19 134/67   05/28/19 127/77   03/20/19 134/80     Currently taking:   Key CAD CHF Meds             simvastatin (ZOCOR) 10 mg tablet TAKE 1 TABLET BY MOUTH ONCE DAILY AT BEDTIME    lisinopril (PRINIVIL, ZESTRIL) 40 mg tablet TAKE 1 TABLET BY MOUTH ONCE DAILY    atenolol-chlorthalidone (TENORETIC) 50-25 mg per tablet TAKE 1 TABLET BY MOUTH ONCE DAILY    aspirin delayed-release 81 mg tablet Take  by mouth daily. Bradycardia present but stable. Caregiver Strain - started Prozac 5/2019 - taking 20 mg daily. Doing well. Feels more emotionally stable. Obesity follow up -   Wt Readings from Last 3 Encounters:   07/09/19 223 lb 12.8 oz (101.5 kg)   05/28/19 222 lb 9.6 oz (101 kg)   03/20/19 230 lb 12.8 oz (104.7 kg)     Weight is stable. This is the Subsequent Medicare Annual Wellness Exam, performed 12 months or more after the Initial AWV or the last Subsequent AWV    I have reviewed the patient's medical history in detail and updated the computerized patient record.      History     Past Medical History:   Diagnosis Date    Arthritis     ALL OVER    GERD (gastroesophageal reflux disease)     Gout     HTN (hypertension)     Hypercholesterolemia     Nephrolithiasis     Neuropathy     Polyclonal gammopathy     Pseudotumor (inflammatory) of orbit     Stroke ([facility] [address] TIA    TIA (transient ischemic attack)     complicated migraine    Urosepsis       Past Surgical History:   Procedure Laterality Date    COLONOSCOPY N/A 11/1/2018    COLONOSCOPY performed by Evin [facility]uitron [facility] at [facility] HX CATARACT REMOVAL Bilateral 2017    HX COLONOSCOPY  2008    HX KNEE REPLACEMENT Right     HX LAP CHOLECYSTECTOMY      HX ORTHOPAEDIC Bilateral     BILATERAL HIP REPLACEMENTS    HX ORTHOPAEDIC      SPUR RIGHT FOOT    HX LYLE AND BSO      HX UROLOGICAL  2012    cysto for kidney stones (was septic)     Current Outpatient Medications   Medication Sig Dispense Refill    FLUoxetine (PROZAC) 20 mg capsule Take 1 Cap by mouth daily. 30 Cap 5    simvastatin (ZOCOR) 10 mg tablet TAKE 1 TABLET BY MOUTH ONCE DAILY AT BEDTIME 90 Tab 1    lisinopril (PRINIVIL, ZESTRIL) 40 mg tablet TAKE 1 TABLET BY MOUTH ONCE DAILY 90 Tab 1    atenolol-chlorthalidone (TENORETIC) 50-25 mg per tablet TAKE 1 TABLET BY MOUTH ONCE DAILY 90 Tab 1    aspirin delayed-release 81 mg tablet Take  by mouth daily. Allergies   Allergen Reactions    Percocet [Oxycodone-Acetaminophen] Unknown (comments)     Sharp pain in neck-headache     Family History   Problem Relation Age of Onset    Hypertension Mother     Stroke Mother     Lupus Daughter      Social History     Tobacco Use    Smoking status: Current Every Day Smoker     Packs/day: 0.25     Years: 20.00     Pack years: 5.00    Smokeless tobacco: Never Used   Substance Use Topics    Alcohol use:  Yes     Alcohol/week: 0.5 oz     Types: 1 Glasses of wine per week     Patient Active Problem List   Diagnosis Code    Nephrolithiasis N20.0    Urosepsis A41.9, N39.0    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 Functional Ability and Level of Safety:   Hearing Loss  Hearing is good. Activities of Daily Living  The home contains: handrails  Patient does total self care     Fall Risk  Fall Risk Assessment, last 12 mths 5/28/2019   Able to walk? Yes   Fall in past 12 months? No   Fall with injury? -   Number of falls in past 12 months -   Fall Risk Score -     Abuse Screen  Patient is not abused    Cognitive Screening   Evaluation of Cognitive Function:  Has your family/caregiver stated any concerns about your memory: no     Patient Care Team   Patient Care Team:  Yari Wright as PCP - General (Physician Assistant)  Kim Santiago RN as Ambulatory Care Navigator (Internal Medicine)    Assessment/Plan   Education and counseling provided:  Are appropriate based on today's review and evaluation    Extended Emergency Contact Information  Primary Emergency Contact: Jose Schultz Phone: [phone]  Mobile Phone: [phone]  Relation: Daughter  Secondary Emergency Contact: Bobo Pond  Mobile Phone: [phone]  Relation: None  Angel Obrien   (son)   [phone]      Review of Systems   Respiratory: Negative for shortness of breath. Cardiovascular: Negative for chest pain and palpitations. Neurological: Negative for dizziness, loss of consciousness and weakness. Psychiatric/Behavioral: Negative for depression. The patient is not nervous/anxious and does not have insomnia. Physical Exam   Constitutional: She is oriented to person, place, and time. She appears well-developed and well-nourished. No distress.    HENT: Head: Normocephalic and atraumatic. Neck: Neck supple. No JVD present. No bruit bilateral carotid arteries. Cardiovascular: Normal rate, regular rhythm and normal heart sounds. Pulmonary/Chest: Effort normal and breath sounds normal. No respiratory distress. Musculoskeletal: She exhibits no edema. Neurological: She is alert and oriented to person, place, and time. Skin: Skin is warm and dry. Psychiatric: She has a normal mood and affect. Her behavior is normal. Judgment and thought content normal.   Nursing note and vitals reviewed. Diagnoses and all orders for this visit:    1. Medicare annual wellness visit, subsequent  See above. 2. Essential hypertension  Controlled. 3. Encounter for immunization  -     varicella-zoster recombinant, PF, (SHINGRIX, PF,) 50 mcg/0.5 mL susr injection; 0.5 mL by IntraMUSCular route once for 1 dose. Please administer once every 6 months for two doses and fax record. Thanks!  -     PNEUMOCOCCAL POLYSACCHARIDE VACCINE, 23-VALENT, ADULT OR IMMUNOSUPPRESSED PT DOSE,    4. Caregiver role strain  Improved with Prozac. 5. Severe obesity ([facility])  Discussed diet/exercise and options for losing weight when she is ready - not currently able to change diet/exercise due to caregiver role. Finn Griffith

## 2019-10-19 DIAGNOSIS — I10 ESSENTIAL HYPERTENSION: ICD-10-CM

## 2019-10-21 RX ORDER — ATENOLOL AND CHLORTHALIDONE TABLET 50; 25 MG/1; MG/1
TABLET ORAL
Qty: 90 TAB | Refills: 1 | Status: SHIPPED | OUTPATIENT
Start: 2019-10-21 | End: 2020-05-18

## 2019-10-21 RX ORDER — LISINOPRIL 40 MG/1
TABLET ORAL
Qty: 90 TAB | Refills: 1 | Status: SHIPPED | OUTPATIENT
Start: 2019-10-21 | End: 2020-05-18 | Stop reason: SDUPTHER

## 2019-12-18 DIAGNOSIS — E78.00 PURE HYPERCHOLESTEROLEMIA: ICD-10-CM

## 2019-12-18 DIAGNOSIS — Z63.8 CAREGIVER ROLE STRAIN: ICD-10-CM

## 2019-12-18 RX ORDER — FLUOXETINE HYDROCHLORIDE 20 MG/1
CAPSULE ORAL
Qty: 30 CAP | Refills: 0 | Status: SHIPPED | OUTPATIENT
Start: 2019-12-18 | End: 2020-01-08 | Stop reason: SDUPTHER

## 2019-12-18 RX ORDER — SIMVASTATIN 10 MG/1
TABLET, FILM COATED ORAL
Qty: 90 TAB | Refills: 0 | Status: SHIPPED | OUTPATIENT
Start: 2019-12-18 | End: 2020-01-14

## 2019-12-18 SDOH — SOCIAL STABILITY - SOCIAL INSECURITY: OTHER SPECIFIED PROBLEMS RELATED TO PRIMARY SUPPORT GROUP: Z63.8

## 2019-12-19 ENCOUNTER — HOSPITAL ENCOUNTER (OUTPATIENT)
Dept: MAMMOGRAPHY | Age: 69
Discharge: HOME | End: 2019-12-19
Attending: PHYSICIAN ASSISTANT
Payer: MEDICARE

## 2019-12-19 DIAGNOSIS — Z12.31 VISIT FOR SCREENING MAMMOGRAM: ICD-10-CM

## 2019-12-19 PROCEDURE — 77067 SCR MAMMO BI INCL CAD: CPT

## 2020-01-07 NOTE — PROGRESS NOTES
Gilberto Aguayo is a 81 y.o. female  Chief Complaint   Patient presents with    Hypertension     follow up    Cholesterol Problem     follow up     Visit Vitals  /68   Pulse (!) 57   Temp 97.7 °F (36.5 °C) (Oral)   Resp 16   Ht 5' 8\" (1.727 m)   Wt 220 lb (99.8 kg)   SpO2 99%   BMI 33.45 kg/m²      Health Maintenance Due   Topic Date Due    Shingrix Vaccine Age 49> (1 of 2) 07/21/2002    Influenza Age 5 to Adult  08/01/2019    GLAUCOMA SCREENING Q2Y  10/23/2019       HPI  HTN, obesity, & cholesterol Follow up - BP controlled:  BP Readings from Last 3 Encounters:   01/08/20 118/68   07/09/19 134/67   05/28/19 127/77     Wt Readings from Last 3 Encounters:   01/08/20 220 lb (99.8 kg)   07/09/19 223 lb 12.8 oz (101.5 kg)   05/28/19 222 lb 9.6 oz (101 kg)   Pt has been eating a lower calorie diet recently. Lab Results   Component Value Date/Time    Creatinine 1.01 (H) 11/14/2018 09:57 AM     Lab Results   Component Value Date/Time    Cholesterol, total 114 11/14/2018 09:57 AM    HDL Cholesterol 38 (L) 11/14/2018 09:57 AM    LDL, calculated 58 11/14/2018 09:57 AM    VLDL, calculated 18 11/14/2018 09:57 AM    Triglyceride 90 11/14/2018 09:57 AM    CHOL/HDL Ratio 4.0 09/17/2010 11:07 AM     Currently taking:   Key CAD CHF Meds             simvastatin (ZOCOR) 10 mg tablet TAKE 1 TABLET BY MOUTH ONCE DAILY AT BEDTIME    lisinopril (PRINIVIL, ZESTRIL) 40 mg tablet TAKE 1 TABLET BY MOUTH ONCE DAILY    atenolol-chlorthalidone (TENORETIC) 50-25 mg per tablet TAKE 1 TABLET BY MOUTH ONCE DAILY    aspirin delayed-release 81 mg tablet Take  by mouth daily. Hx Caregiver role strain -  passed away October 3rd - has excellent emotional support from friends and family. Doing well - taking Prozac 20 mg daily. Had back pain last night and worried about hx kidney stone. Took 2 alleve this morning at 3 AM and now pain is gone. No dysuria    Review of Systems   Respiratory: Negative for shortness of breath. Cardiovascular: Negative for chest pain and palpitations. Genitourinary: Negative for dysuria, frequency, hematuria and urgency. Neurological: Negative for dizziness, loss of consciousness and weakness. Psychiatric/Behavioral: Negative for depression. The patient is not nervous/anxious. Physical Exam  Vitals signs and nursing note reviewed. Constitutional:       General: She is not in acute distress. Appearance: She is well-developed. HENT:      Head: Normocephalic and atraumatic. Neck:      Musculoskeletal: Neck supple. Vascular: No JVD. Comments: No bruit bilateral carotid arteries. Cardiovascular:      Rate and Rhythm: Normal rate and regular rhythm. Heart sounds: Normal heart sounds. Pulmonary:      Effort: Pulmonary effort is normal. No respiratory distress. Breath sounds: Normal breath sounds. Skin:     General: Skin is warm and dry. Neurological:      Mental Status: She is alert and oriented to person, place, and time. Psychiatric:         Mood and Affect: Mood normal.         Behavior: Behavior normal.         Thought Content: Thought content normal.         Judgment: Judgment normal.         Diagnoses and all orders for this visit:    1. Essential hypertension - controlled  -     METABOLIC PANEL, COMPREHENSIVE; Future    2. Pure hypercholesterolemia  -     LIPID PANEL; Future    3. Severe obesity ([address]  Congratulated pt on weight loss success and encouraged continued efforts. 4. Grief  -     FLUoxetine (PROZAC) 20 mg capsule; Take 1 Cap by mouth daily. Excellent social support    5.  Left flank pain  -     URINALYSIS W/ REFLEX CULTURE; Future

## 2020-01-08 ENCOUNTER — OFFICE VISIT (OUTPATIENT)
Dept: INTERNAL MEDICINE CLINIC | Age: 70
End: 2020-01-08

## 2020-01-08 VITALS
SYSTOLIC BLOOD PRESSURE: 118 MMHG | HEIGHT: 68 IN | RESPIRATION RATE: 16 BRPM | BODY MASS INDEX: 33.34 KG/M2 | HEART RATE: 57 BPM | TEMPERATURE: 97.7 F | DIASTOLIC BLOOD PRESSURE: 68 MMHG | OXYGEN SATURATION: 99 % | WEIGHT: 220 LBS

## 2020-01-08 DIAGNOSIS — I10 ESSENTIAL HYPERTENSION: Primary | ICD-10-CM

## 2020-01-08 DIAGNOSIS — E66.01 SEVERE OBESITY (HCC): ICD-10-CM

## 2020-01-08 DIAGNOSIS — E78.00 PURE HYPERCHOLESTEROLEMIA: ICD-10-CM

## 2020-01-08 DIAGNOSIS — R10.9 LEFT FLANK PAIN: ICD-10-CM

## 2020-01-08 DIAGNOSIS — F43.21 GRIEF: ICD-10-CM

## 2020-01-08 RX ORDER — FLUOXETINE HYDROCHLORIDE 20 MG/1
20 CAPSULE ORAL DAILY
Qty: 90 CAP | Refills: 1 | Status: SHIPPED | OUTPATIENT
Start: 2020-01-08 | End: 2020-08-19

## 2020-01-08 NOTE — PROGRESS NOTES
1. Have you been to the ER, urgent care clinic since your last visit? Hospitalized since your last visit?no    2. Have you seen or consulted any other health care providers outside of the [address] [place] [address] since your last visit? Include any pap smears or colon screening.  Yes    Chief Complaint   Patient presents with    Hypertension     follow up    Cholesterol Problem     follow up

## 2020-01-14 DIAGNOSIS — E78.00 PURE HYPERCHOLESTEROLEMIA: ICD-10-CM

## 2020-01-14 RX ORDER — SIMVASTATIN 10 MG/1
TABLET, FILM COATED ORAL
Qty: 90 TAB | Refills: 0 | Status: SHIPPED | OUTPATIENT
Start: 2020-01-14 | End: 2020-05-18 | Stop reason: SDUPTHER

## 2020-01-16 ENCOUNTER — HOSPITAL ENCOUNTER (OUTPATIENT)
Dept: LAB | Age: 70
Discharge: HOME | End: 2020-01-16

## 2020-01-16 DIAGNOSIS — E78.00 PURE HYPERCHOLESTEROLEMIA: ICD-10-CM

## 2020-01-16 DIAGNOSIS — R10.9 LEFT FLANK PAIN: ICD-10-CM

## 2020-01-16 DIAGNOSIS — I10 ESSENTIAL HYPERTENSION: ICD-10-CM

## 2020-01-16 LAB
ALBUMIN SERPL-MCNC: 3.4 G/DL (ref 3.5–5)
ALBUMIN/GLOB SERPL: 0.9 {RATIO} (ref 1.1–2.2)
ALP SERPL-CCNC: 108 U/L (ref 45–117)
ALT SERPL-CCNC: 33 U/L (ref 12–78)
ANION GAP SERPL CALC-SCNC: 3 MMOL/L (ref 5–15)
APPEARANCE UR: ABNORMAL
AST SERPL-CCNC: 20 U/L (ref 15–37)
BACTERIA URNS QL MICRO: ABNORMAL /HPF
BILIRUB SERPL-MCNC: 0.3 MG/DL (ref 0.2–1)
BILIRUB UR QL: NEGATIVE
BUN SERPL-MCNC: 18 MG/DL (ref 6–20)
BUN/CREAT SERPL: 17 (ref 12–20)
CALCIUM SERPL-MCNC: 9.1 MG/DL (ref 8.5–10.1)
CHLORIDE SERPL-SCNC: 107 MMOL/L (ref 97–108)
CHOLEST SERPL-MCNC: 139 MG/DL
CO2 SERPL-SCNC: 30 MMOL/L (ref 21–32)
COLOR UR: ABNORMAL
CREAT SERPL-MCNC: 1.04 MG/DL (ref 0.55–1.02)
EPITH CASTS URNS QL MICRO: ABNORMAL /LPF
GLOBULIN SER CALC-MCNC: 3.9 G/DL (ref 2–4)
GLUCOSE SERPL-MCNC: 104 MG/DL (ref 65–100)
GLUCOSE UR STRIP.AUTO-MCNC: NEGATIVE MG/DL
HDLC SERPL-MCNC: 34 MG/DL
HDLC SERPL: 4.1 {RATIO} (ref 0–5)
HGB UR QL STRIP: ABNORMAL
KETONES UR QL STRIP.AUTO: NEGATIVE MG/DL
LDLC SERPL CALC-MCNC: 80.2 MG/DL (ref 0–100)
LEUKOCYTE ESTERASE UR QL STRIP.AUTO: ABNORMAL
LIPID PROFILE,FLP: NORMAL
NITRITE UR QL STRIP.AUTO: POSITIVE
PH UR STRIP: 8 [PH] (ref 5–8)
POTASSIUM SERPL-SCNC: 3.8 MMOL/L (ref 3.5–5.1)
PROT SERPL-MCNC: 7.3 G/DL (ref 6.4–8.2)
PROT UR STRIP-MCNC: NEGATIVE MG/DL
RBC #/AREA URNS HPF: ABNORMAL /HPF (ref 0–5)
SODIUM SERPL-SCNC: 140 MMOL/L (ref 136–145)
SP GR UR REFRACTOMETRY: 1.01 (ref 1–1.03)
TRI-PHOS CRY URNS QL MICRO: ABNORMAL
TRIGL SERPL-MCNC: 124 MG/DL (ref ?–150)
UA: UC IF INDICATED,UAUC: ABNORMAL
UROBILINOGEN UR QL STRIP.AUTO: 0.2 EU/DL (ref 0.2–1)
VLDLC SERPL CALC-MCNC: 24.8 MG/DL
WBC URNS QL MICRO: >100 /HPF (ref 0–4)

## 2020-01-17 ENCOUNTER — TELEPHONE (OUTPATIENT)
Dept: INTERNAL MEDICINE CLINIC | Age: 70
End: 2020-01-17

## 2020-01-17 DIAGNOSIS — R73.01 ELEVATED FASTING GLUCOSE: ICD-10-CM

## 2020-01-17 RX ORDER — NITROFURANTOIN 25; 75 MG/1; MG/1
100 CAPSULE ORAL 2 TIMES DAILY
Qty: 14 CAP | Refills: 0 | Status: SHIPPED | OUTPATIENT
Start: 2020-01-17 | End: 2020-01-24

## 2020-01-19 LAB
BACTERIA SPEC CULT: ABNORMAL
CC UR VC: ABNORMAL
SERVICE CMNT-IMP: ABNORMAL

## 2020-01-20 ENCOUNTER — TELEPHONE (OUTPATIENT)
Dept: INTERNAL MEDICINE CLINIC | Age: 70
End: 2020-01-20

## 2020-01-20 DIAGNOSIS — N30.00 ACUTE CYSTITIS WITHOUT HEMATURIA: Primary | ICD-10-CM

## 2020-01-20 RX ORDER — CEFUROXIME AXETIL 500 MG/1
500 TABLET ORAL 2 TIMES DAILY
Qty: 14 TAB | Refills: 0 | Status: SHIPPED | OUTPATIENT
Start: 2020-01-20 | End: 2020-07-07 | Stop reason: ALTCHOICE

## 2020-01-20 NOTE — TELEPHONE ENCOUNTER
Please inform pt that her urine grew two different bacteria - one of which isn't susceptible to the macrobid. I'd like her to stop the macrobid and change the antibiotic to Ceftin twice daily for 7 days.

## 2020-05-16 DIAGNOSIS — I10 ESSENTIAL HYPERTENSION: ICD-10-CM

## 2020-05-18 DIAGNOSIS — E78.00 PURE HYPERCHOLESTEROLEMIA: ICD-10-CM

## 2020-05-18 DIAGNOSIS — I10 ESSENTIAL HYPERTENSION: ICD-10-CM

## 2020-05-18 RX ORDER — SIMVASTATIN 10 MG/1
10 TABLET, FILM COATED ORAL DAILY
Qty: 90 TAB | Refills: 1 | Status: SHIPPED | OUTPATIENT
Start: 2020-05-18 | End: 2021-01-11

## 2020-05-18 RX ORDER — LISINOPRIL 40 MG/1
40 TABLET ORAL DAILY
Qty: 90 TAB | Refills: 1 | Status: SHIPPED | OUTPATIENT
Start: 2020-05-18 | End: 2021-04-21 | Stop reason: DRUGHIGH

## 2020-05-18 RX ORDER — ATENOLOL AND CHLORTHALIDONE TABLET 50; 25 MG/1; MG/1
TABLET ORAL
Qty: 90 TAB | Refills: 0 | Status: SHIPPED | OUTPATIENT
Start: 2020-05-18 | End: 2020-07-07 | Stop reason: ALTCHOICE

## 2020-07-07 ENCOUNTER — VIRTUAL VISIT (OUTPATIENT)
Dept: INTERNAL MEDICINE CLINIC | Age: 70
End: 2020-07-07

## 2020-07-07 DIAGNOSIS — I10 ESSENTIAL HYPERTENSION: Primary | ICD-10-CM

## 2020-07-07 DIAGNOSIS — N20.0 STAGHORN CALCULUS: ICD-10-CM

## 2020-07-07 DIAGNOSIS — E78.00 PURE HYPERCHOLESTEROLEMIA: ICD-10-CM

## 2020-07-07 RX ORDER — POTASSIUM CITRATE 15 MEQ/1
TABLET, EXTENDED RELEASE ORAL
COMMUNITY
Start: 2020-07-04

## 2020-07-07 RX ORDER — ATENOLOL 50 MG/1
50 TABLET ORAL DAILY
Qty: 30 TAB | Refills: 5 | Status: SHIPPED | OUTPATIENT
Start: 2020-07-07 | End: 2020-12-21

## 2020-07-07 RX ORDER — CHLORTHALIDONE 25 MG/1
TABLET ORAL
COMMUNITY
Start: 2020-06-11

## 2020-07-07 RX ORDER — HYDROMORPHONE HYDROCHLORIDE 2 MG/1
TABLET ORAL
COMMUNITY
Start: 2020-06-18 | End: 2021-04-21

## 2020-07-07 NOTE — PROGRESS NOTES
Barry Villalobos is a 81 y.o. female who was seen by telephone on 07/07/20  Patient has no Virtual Visit capability available at time of visit. Consent: Baryr Zhao , who was seen by telephone, and/or her healthcare decision maker, is aware that this patient-initiated, Telephone encounter on 07/07/20 is a billable service, with coverage as determined by his insurance carrier. she is aware that she  may receive a bill and has provided verbal consent to proceed: Yes    Assessment & Plan:   Diagnoses and all orders for this visit:    1. Essential hypertension - overtreated  -   Change Atenolol/Chlorthalidone to atenoloL (TENORMIN) 50 mg tablet; Take 1 Tab by mouth daily. Follow up in 1 month. If BP still low, will try adjusting dose of Lisinopril. 2. Pure hypercholesterolemia  Controlled    3. Staghorn calculus  Continue following up with Urology. 12 minutes spent on this visit with patient today. 712  Subjective:   Barry Villalobos is a 81 y.o. femalewho was seen for   Chief Complaint   Patient presents with    Cholesterol Problem     follow up    Hypertension     follow up     HTN Follow up - BP controlled, but slightly overtreated now that pt needs to take Chlorthalidone 25 mg BID per Urology. Pt is feeling somewhat dizzy and fatigued on this regimen. She is having surgery for kidney stone soon with Dr. Gabriele Arriaga. [facility] has been checking kidney function labs regularly. Patient-Reported Vitals 7/7/2020   Patient-Reported Weight 214lb   Patient-Reported Pulse 57   Patient-Reported Systolic  018   Patient-Reported Diastolic 69      BP Readings from Last 3 Encounters:   01/08/20 118/68   07/09/19 134/67   05/28/19 127/77     Pt has been working on diet and has lost weight! Cholesterol is controlled.      Lab Results   Component Value Date/Time    Cholesterol, total 139 01/16/2020 10:42 AM    HDL Cholesterol 34 01/16/2020 10:42 AM    LDL, calculated 80.2 01/16/2020 10:42 AM    VLDL, calculated 24.8 01/16/2020 10:42 AM    Triglyceride 124 01/16/2020 10:42 AM    CHOL/HDL Ratio 4.1 01/16/2020 10:42 AM     Currently taking:   Key CAD CHF Meds             atenoloL-chlorthalidone (TENORETIC) 50-25 mg per tablet (Taking) Take 1 tablet by mouth once daily    lisinopriL (PRINIVIL, ZESTRIL) 40 mg tablet (Taking) Take 1 Tab by mouth daily. simvastatin (ZOCOR) 10 mg tablet (Taking) Take 1 Tab by mouth daily. aspirin delayed-release 81 mg tablet (Taking) Take  by mouth daily. chlorthalidone (HYGROTEN) 25 mg tablet TAKE 1 TABLET BY MOUTH TWICE DAILY        Review of Systems   Constitutional: Negative for fever. Respiratory: Negative for shortness of breath. Cardiovascular: Negative for chest pain and palpitations. Genitourinary: Positive for hematuria. Neurological: Positive for dizziness. Negative for loss of consciousness and weakness. Objective:     Patient-Reported Vitals 7/7/2020   Patient-Reported Weight 214lb   Patient-Reported Pulse 57   Patient-Reported Systolic  442   Patient-Reported Diastolic 69      A&Ox 3. NAD. Breath sounds normal over phone. Conversation normal and consistent with stated mood    We discussed the expected course, resolution and complications of the diagnosis(es) in detail. Medication risks, benefits, costs, interactions, and alternatives were discussed as indicated. I advised him to contact the office if his condition worsens, changes or fails to improve as anticipated. He expressed understanding with the diagnosis(es) and plan. Nereida Lopez is a 81 y.o. female who was evaluated by a telephone visit encounter for concerns as above. Patient identification was verified prior to start of the visit. A caregiver was present when appropriate.  Due to this being a telephone encounter (During PPARF-94 public health emergency), evaluation of the following organ systems was limited: Vitals/Constitutional/EENT/Resp/CV/GI//MS/Neuro/Skin/Heme-Lymph-Imm. Pursuant to the emergency declaration under the [address] [place] [address], [facility] waiver authority and the [employer] Act, this Virtual  Visit was conducted, with patient's (and/or legal guardian's) consent, to reduce the patient's risk of exposure to COVID-19 and provide necessary medical care. Services were provided through a telephone discussion to substitute for in-person clinic visit. Patient and provider were located at their individual homes.       Candace Alan PA-C

## 2020-07-07 NOTE — PROGRESS NOTES
1. Have you been to the ER, urgent care clinic since your last visit? Hospitalized since your last visit? No    2. Have you seen or consulted any other health care providers outside of the [address] [place] [address] since your last visit? Include any pap smears or colon screening.  No    Chief Complaint   Patient presents with    Cholesterol Problem     follow up    Hypertension     follow up

## 2020-07-07 NOTE — ROUTINE PROCESS
Have you been tested for COVID-19 in the past 7 days? No    Do you have a personal history of COVID-19 within the past 28 days? no  If Yes, What was the method of testing: clinical assumption or test result? Have you had close contact with a known to be positive COVID-19 patient within the past 14 days? No    Are you a healthcare worker or ? If Yes, have you been exposed to COVID-19 without proper PPE? No    Do you live in a SNF, adult home or other institutional setting? No  If Yes, have they experienced a flood of COVID-19 positive patients?     In the past 2-14 days have you had any of the following symptoms    Cough  No   New onset Shortness of breath or difficulty   Breathing  No    Or at least two of these symptoms:   Fever greater than 100 F  No   Chills  No   Repeated shaking with chills  No   Muscle pain  No   Headache  No   Sore throat  No   New loss of taste or smell  No   New onset diarrhea  No

## 2020-07-08 ENCOUNTER — HOSPITAL ENCOUNTER (OUTPATIENT)
Dept: INTERVENTIONAL RADIOLOGY/VASCULAR | Age: 70
Discharge: HOME | End: 2020-07-08
Attending: UROLOGY
Payer: MEDICARE

## 2020-07-08 ENCOUNTER — HOSPITAL ENCOUNTER (OUTPATIENT)
Dept: INFUSION THERAPY | Age: 70
Discharge: HOME | End: 2020-07-08
Payer: MEDICARE

## 2020-07-08 VITALS
HEIGHT: 68 IN | TEMPERATURE: 98.5 F | WEIGHT: 218 LBS | RESPIRATION RATE: 22 BRPM | OXYGEN SATURATION: 99 % | SYSTOLIC BLOOD PRESSURE: 112 MMHG | HEART RATE: 78 BPM | DIASTOLIC BLOOD PRESSURE: 76 MMHG | BODY MASS INDEX: 33.04 KG/M2

## 2020-07-08 DIAGNOSIS — R31.0 MACROSCOPIC HEMATURIA: ICD-10-CM

## 2020-07-08 PROCEDURE — 74011000258 HC RX REV CODE- 258: Performed by: UROLOGY

## 2020-07-08 PROCEDURE — 96365 THER/PROPH/DIAG IV INF INIT: CPT

## 2020-07-08 PROCEDURE — 74011250636 HC RX REV CODE- 250/636: Performed by: UROLOGY

## 2020-07-08 PROCEDURE — 36573 INSJ PICC RS&I 5 YR+: CPT

## 2020-07-08 PROCEDURE — 74011250636 HC RX REV CODE- 250/636: Performed by: RADIOLOGY

## 2020-07-08 PROCEDURE — 74011000250 HC RX REV CODE- 250: Performed by: RADIOLOGY

## 2020-07-08 PROCEDURE — C1751 CATH, INF, PER/CENT/MIDLINE: HCPCS

## 2020-07-08 RX ORDER — HEPARIN 100 UNIT/ML
500 SYRINGE INTRAVENOUS AS NEEDED
Status: DISCONTINUED | OUTPATIENT
Start: 2020-07-08 | End: 2020-07-09 | Stop reason: HOSPADM

## 2020-07-08 RX ORDER — SODIUM CHLORIDE 0.9 % (FLUSH) 0.9 %
10-40 SYRINGE (ML) INJECTION AS NEEDED
Status: DISCONTINUED | OUTPATIENT
Start: 2020-07-08 | End: 2020-07-09 | Stop reason: HOSPADM

## 2020-07-08 RX ORDER — HEPARIN SODIUM 200 [USP'U]/100ML
400 INJECTION, SOLUTION INTRAVENOUS ONCE
Status: COMPLETED | OUTPATIENT
Start: 2020-07-08 | End: 2020-07-08

## 2020-07-08 RX ORDER — LIDOCAINE HYDROCHLORIDE 20 MG/ML
20 INJECTION, SOLUTION INFILTRATION; PERINEURAL ONCE
Status: COMPLETED | OUTPATIENT
Start: 2020-07-08 | End: 2020-07-08

## 2020-07-08 RX ADMIN — LIDOCAINE HYDROCHLORIDE 200 MG: 20 INJECTION, SOLUTION INFILTRATION; PERINEURAL at 08:20

## 2020-07-08 RX ADMIN — Medication 500 UNITS: at 10:58

## 2020-07-08 RX ADMIN — Medication 10 ML: at 10:56

## 2020-07-08 RX ADMIN — SODIUM CHLORIDE 240 MG: 9 INJECTION, SOLUTION INTRAVENOUS at 09:55

## 2020-07-08 RX ADMIN — Medication 10 ML: at 10:55

## 2020-07-08 RX ADMIN — Medication 500 UNITS: at 10:57

## 2020-07-08 RX ADMIN — Medication 10 ML: at 09:50

## 2020-07-08 RX ADMIN — HEPARIN SODIUM IN SODIUM CHLORIDE 400 UNITS: 200 INJECTION INTRAVENOUS at 08:20

## 2020-07-08 NOTE — ROUTINE PROCESS
Procedure reviewed with patient by Dr. Ab Mittal. Opportunity to verbalize questions and concerns. Consent obtained.

## 2020-07-08 NOTE — DISCHARGE INSTRUCTIONS
[facility]  Department of Interventional Radiology/Special Procedures   [phone]      Radiologist: Dr. Fredy Judd      Date:   7/8/2020      PICC Catheter Discharge Instructions    Your new PICC line is ready to be used. Line placement has been verified by the  Radiologist under fluoroscopy. PICC line brand/POWER:      The catheter length is:        Keep the dressing clean and dry. Do not get it wet. No showers. Baths are OK. Watch for signs of infection:    a. New or increasing pain around PICC line  b. Fever/Chills  c. Drainage or pus  d. New swelling or redness in arm    Please call your physician if you note any signs of infection. You may take Tylenol, as directed on the label, for pain if allowed. Resume previous diet and continue your prescribed medications. Change the dressing every 3 days or any time the dressing becomes wet, soiled or loose. If you have [address] they can assist you with the dressing change. Please follow up with your doctor regarding further care and use of this catheter.

## 2020-07-08 NOTE — DISCHARGE INSTRUCTIONS
[facility]  Department of Interventional Radiology/Special Procedures   [phone]      Radiologist: Kathy Maier      Date:   7/8/2020      PICC Catheter Discharge Instructions    Your new PICC line is ready to be used. Line placement has been verified by the  Radiologist under fluoroscopy. PICC line brand/POWER:      The catheter length is:        Keep the dressing clean and dry. Do not get it wet. No showers. Baths are OK. Watch for signs of infection:    a. New or increasing pain around PICC line  b. Fever/Chills  c. Drainage or pus  d. New swelling or redness in arm    Please call your physician if you note any signs of infection. You may take Tylenol, as directed on the label, for pain if allowed. Resume previous diet and continue your prescribed medications. Change the dressing every 3 days or any time the dressing becomes wet, soiled or loose. If you have [address] they can assist you with the dressing change. Please follow up with your doctor regarding further care and use of this catheter.

## 2020-07-09 ENCOUNTER — HOSPITAL ENCOUNTER (OUTPATIENT)
Dept: INFUSION THERAPY | Age: 70
Discharge: HOME | End: 2020-07-09
Payer: MEDICARE

## 2020-07-09 VITALS
DIASTOLIC BLOOD PRESSURE: 66 MMHG | SYSTOLIC BLOOD PRESSURE: 114 MMHG | TEMPERATURE: 96.9 F | OXYGEN SATURATION: 100 % | HEART RATE: 70 BPM | RESPIRATION RATE: 20 BRPM

## 2020-07-09 PROCEDURE — 74011250636 HC RX REV CODE- 250/636

## 2020-07-09 PROCEDURE — 96365 THER/PROPH/DIAG IV INF INIT: CPT

## 2020-07-09 PROCEDURE — 74011250636 HC RX REV CODE- 250/636: Performed by: UROLOGY

## 2020-07-09 PROCEDURE — 74011000258 HC RX REV CODE- 258

## 2020-07-09 RX ORDER — SODIUM CHLORIDE 0.9 % (FLUSH) 0.9 %
5-10 SYRINGE (ML) INJECTION AS NEEDED
Status: DISCONTINUED | OUTPATIENT
Start: 2020-07-09 | End: 2020-07-10 | Stop reason: HOSPADM

## 2020-07-09 RX ORDER — HEPARIN 100 UNIT/ML
500 SYRINGE INTRAVENOUS AS NEEDED
Status: DISCONTINUED | OUTPATIENT
Start: 2020-07-09 | End: 2020-07-10 | Stop reason: HOSPADM

## 2020-07-09 RX ADMIN — Medication 30 ML: at 17:46

## 2020-07-09 RX ADMIN — SODIUM CHLORIDE 240 MG: 9 INJECTION, SOLUTION INTRAVENOUS at 16:03

## 2020-07-09 RX ADMIN — Medication 1000 UNITS: at 17:47

## 2020-07-09 NOTE — PROGRESS NOTES
OPIC Short Visit Note:    4659 - Pt arrived to [facility] ambulatory and in no distress for daily abx therapy. DEMOND DL PICC intact to [facility], both ports flushed with +BR. Diarrhea x2 this a.m. Instructed to report worsening diarrhea and to drink plenty of fluids and eat bland, starchy foods. Visit Vitals  /66 (BP 1 Location: Left arm, BP Patient Position: Sitting)   Pulse 70   Temp 96.9 °F (36.1 °C)   Resp 20   LMP 09/17/1987   SpO2 100%       Medications received:  Gentamycin 240mg over 1hr    1710 - Pt tolerated treatment well, no adverse reaction noted. Both ports of DEMOND PICC flushed per protocol and end caps applied. Discharged from [facility] ambulatory and in no acute distress. Next appt 7/10/20 at [facility].

## 2020-07-10 ENCOUNTER — HOSPITAL ENCOUNTER (OUTPATIENT)
Dept: INFUSION THERAPY | Age: 70
Discharge: HOME | End: 2020-07-10

## 2020-07-10 ENCOUNTER — HOSPITAL ENCOUNTER (OUTPATIENT)
Dept: INFUSION THERAPY | Age: 70
Discharge: HOME | End: 2020-07-10
Payer: MEDICARE

## 2020-07-10 VITALS
SYSTOLIC BLOOD PRESSURE: 116 MMHG | TEMPERATURE: 96.7 F | RESPIRATION RATE: 16 BRPM | DIASTOLIC BLOOD PRESSURE: 68 MMHG | HEART RATE: 58 BPM

## 2020-07-10 PROCEDURE — 74011000258 HC RX REV CODE- 258: Performed by: UROLOGY

## 2020-07-10 PROCEDURE — 96365 THER/PROPH/DIAG IV INF INIT: CPT

## 2020-07-10 PROCEDURE — 74011250636 HC RX REV CODE- 250/636: Performed by: UROLOGY

## 2020-07-10 RX ORDER — SODIUM CHLORIDE 0.9 % (FLUSH) 0.9 %
5-10 SYRINGE (ML) INJECTION AS NEEDED
Status: DISCONTINUED | OUTPATIENT
Start: 2020-07-10 | End: 2020-07-11 | Stop reason: HOSPADM

## 2020-07-10 RX ORDER — SODIUM CHLORIDE 9 MG/ML
25 INJECTION, SOLUTION INTRAVENOUS CONTINUOUS
Status: DISCONTINUED | OUTPATIENT
Start: 2020-07-10 | End: 2020-07-11 | Stop reason: HOSPADM

## 2020-07-10 RX ORDER — HEPARIN 100 UNIT/ML
500 SYRINGE INTRAVENOUS AS NEEDED
Status: DISCONTINUED | OUTPATIENT
Start: 2020-07-10 | End: 2020-07-11 | Stop reason: HOSPADM

## 2020-07-10 RX ADMIN — HEPARIN 500 UNITS: 100 SYRINGE at 18:55

## 2020-07-10 RX ADMIN — Medication 10 ML: at 18:55

## 2020-07-10 RX ADMIN — GENTAMICIN SULFATE 240 MG: 40 INJECTION, SOLUTION INTRAMUSCULAR; INTRAVENOUS at 17:12

## 2020-07-10 RX ADMIN — HEPARIN 500 UNITS: 100 SYRINGE at 16:07

## 2020-07-10 RX ADMIN — Medication 10 ML: at 16:06

## 2020-07-10 RX ADMIN — SODIUM CHLORIDE 25 ML/HR: 900 INJECTION, SOLUTION INTRAVENOUS at 16:06

## 2020-07-10 NOTE — PROGRESS NOTES
OPIC Short Note                       Date: July 10, 2020    Name: Aamir Ann    MRN: [id number]         : 1950      1555 Pt admit to [facility] for daily Gentamicin ambulatory in stable condition. Assessment completed. No new concerns voiced. Double lumen PICC attached to NS at [facility]. Ms. [place] vitals were reviewed prior to and after treatment. Patient Vitals for the past 12 hrs:   Temp Pulse Resp BP   07/10/20 1855  (!) 58 16 116/68   07/10/20 1558 (!) 96.7 °F (35.9 °C) 60 16 (!) 87/54     Medications given:   Medications Administered     0.9% sodium chloride infusion     Admin Date  07/10/2020 Action  New Bag Dose  25 mL/hr Rate  25 mL/hr Route  IntraVENous Administered By  Rosario Cervantes RN          gentamicin (GARAMYCIN) 240 mg in 0.9% sodium chloride 100 mL IVPB     Admin Date  07/10/2020 Action  New Bag Dose  240 mg Rate  100 mL/hr Route  IntraVENous Administered By  Rosario Cervantes RN          heparin (porcine) pf 500 Units     Admin Date  07/10/2020 Action  Given Dose  500 Units Route  IntraVENous Administered By  Rosario Cervantes RN           Admin Date  07/10/2020 Action  Given Dose  500 Units Route  IntraVENous Administered By  Rosario Cervantes RN          sodium chloride (NS) flush 5-10 mL     Admin Date  07/10/2020 Action  Given Dose  10 mL Route  IntraVENous Administered By  Rosario Cervantes RN           Admin Date  07/10/2020 Action  Given Dose  10 mL Route  IntraVENous Administered By  Rosario Cervantes RN              PICC flushed, heparinized and capped per protocol. Ms. Ramila Lynn tolerated the infusion, had no complaints, and was discharged from [place] [address] in stable condition at [address] [place] [address].      Future Appointments   Date Time Provider [facility] [place]   2020  9:00 AM [facility]   2020  9:30 AM RUTHY House RN  July 10, 2020  7:07 PM

## 2020-07-11 ENCOUNTER — HOSPITAL ENCOUNTER (OUTPATIENT)
Dept: INFUSION THERAPY | Age: 70
Discharge: HOME | End: 2020-07-11
Payer: MEDICARE

## 2020-07-11 VITALS
SYSTOLIC BLOOD PRESSURE: 117 MMHG | DIASTOLIC BLOOD PRESSURE: 68 MMHG | RESPIRATION RATE: 18 BRPM | HEART RATE: 52 BPM | TEMPERATURE: 97.2 F

## 2020-07-11 PROCEDURE — 74011250636 HC RX REV CODE- 250/636: Performed by: UROLOGY

## 2020-07-11 PROCEDURE — 74011000258 HC RX REV CODE- 258: Performed by: UROLOGY

## 2020-07-11 PROCEDURE — 96365 THER/PROPH/DIAG IV INF INIT: CPT

## 2020-07-11 RX ORDER — SODIUM CHLORIDE 0.9 % (FLUSH) 0.9 %
5-10 SYRINGE (ML) INJECTION AS NEEDED
Status: DISCONTINUED | OUTPATIENT
Start: 2020-07-11 | End: 2020-07-12 | Stop reason: HOSPADM

## 2020-07-11 RX ORDER — HEPARIN 100 UNIT/ML
500 SYRINGE INTRAVENOUS AS NEEDED
Status: DISCONTINUED | OUTPATIENT
Start: 2020-07-11 | End: 2020-07-12 | Stop reason: HOSPADM

## 2020-07-11 RX ADMIN — Medication 10 ML: at 09:56

## 2020-07-11 RX ADMIN — HEPARIN 500 UNITS: 100 SYRINGE at 09:57

## 2020-07-11 RX ADMIN — GENTAMICIN SULFATE 240 MG: 40 INJECTION, SOLUTION INTRAMUSCULAR; INTRAVENOUS at 08:55

## 2020-07-11 RX ADMIN — HEPARIN 500 UNITS: 100 SYRINGE at 09:56

## 2020-07-11 RX ADMIN — Medication 10 ML: at 08:55

## 2020-07-11 RX ADMIN — Medication 10 ML: at 09:55

## 2020-07-11 NOTE — PROGRESS NOTES
Outpatient Infusion Center Progress Note    6458  Pt arrived in stable condition for IV antibiotics    Assessment unremarkable, no new concerns voiced. R double lumen PICC line flushed with positive blood return noted. Patient Vitals for the past 12 hrs:   Temp Pulse Resp BP   07/11/20 0852 97.2 °F (36.2 °C) (!) 52 18 117/68     The following medications administered:  Medications Administered     gentamicin (GARAMYCIN) 240 mg in 0.9% sodium chloride 100 mL IVPB     Admin Date  07/11/2020 Action  New Bag Dose  240 mg Rate  100 mL/hr Route  IntraVENous Administered By  Jeanne Hernandes RN          heparin (porcine) pf 500 Units     Admin Date  07/11/2020 Action  Given Dose  500 Units Route  IntraVENous Administered By  Jeanne Hernandes RN           Admin Date  07/11/2020 Action  Given Dose  500 Units Route  IntraVENous Administered By  Jeanne Hernandes RN          sodium chloride (NS) flush 5-10 mL     Admin Date  07/11/2020 Action  Given Dose  10 mL Route  IntraVENous Administered By  Jeanne Hernandes RN           Admin Date  07/11/2020 Action  Given Dose  10 mL Route  IntraVENous Administered By  Jeanne Hernandes RN           Admin Date  07/11/2020 Action  Given Dose  10 mL Route  IntraVENous Administered By  Jeanne Hernandes RN              Pt tolerated treatment well. PICC line flushed, heparinized, and capped. 1000  Pt discharged in no acute distress.  Next appointment:    Future Appointments   Date Time Provider Jose Miguel Queen   7/12/2020  9:30 AM [address]

## 2020-07-12 ENCOUNTER — HOSPITAL ENCOUNTER (OUTPATIENT)
Dept: INFUSION THERAPY | Age: 70
Discharge: HOME | End: 2020-07-12
Payer: MEDICARE

## 2020-07-12 VITALS
HEART RATE: 50 BPM | TEMPERATURE: 97.6 F | RESPIRATION RATE: 16 BRPM | SYSTOLIC BLOOD PRESSURE: 113 MMHG | DIASTOLIC BLOOD PRESSURE: 64 MMHG

## 2020-07-12 PROCEDURE — 74011250636 HC RX REV CODE- 250/636: Performed by: UROLOGY

## 2020-07-12 PROCEDURE — 74011000258 HC RX REV CODE- 258: Performed by: UROLOGY

## 2020-07-12 PROCEDURE — 96365 THER/PROPH/DIAG IV INF INIT: CPT

## 2020-07-12 RX ORDER — SODIUM CHLORIDE 0.9 % (FLUSH) 0.9 %
10-40 SYRINGE (ML) INJECTION AS NEEDED
Status: DISCONTINUED | OUTPATIENT
Start: 2020-07-12 | End: 2020-07-13 | Stop reason: HOSPADM

## 2020-07-12 RX ADMIN — Medication 10 ML: at 09:25

## 2020-07-12 RX ADMIN — GENTAMICIN SULFATE 240 MG: 40 INJECTION, SOLUTION INTRAMUSCULAR; INTRAVENOUS at 09:25

## 2020-07-12 RX ADMIN — Medication 10 ML: at 10:25

## 2020-07-12 NOTE — DISCHARGE INSTRUCTIONS
OUTPATIENT INFUSION CENTER    DISCHARGE INSTRUCTIONS FOR:  REMOVAL OF PICC LINE    Now that your PICC Line has been removed, please follow the instructions below regarding your site care: You will be kept resting in a supine position for 30--60 minutes immediately after the PICC has been removed. Avoid heavy lifting or excessive use of the extremity for 24 hours. If drainage or bleeding is present, apply direct pressure until it has stopped. If bleeding persists, continue pressure and seek emergency medical care. The area under the dressing must be kept clean and dry for 3 days. Do not submerge the area in water for 3 days. You may shower, but cover the dressing with clear plastic wrap and apply tape around the edges. Remove the plastic wrap after showering. After 3 days, the dressing may be removed. Before removing the dressing, wash hands thoroughly with soap and water. Inspect the site. Gently wash with warm soapy water. Pat dry and re-cover with a band aid until a scab forms. Report to your physician any of the following:    Chills and fever;  Swelling, redness, pain or if site is warm to the touch; Any pus or unusual drainage from the site; Any questions or concerns.     Radha Marker, Signature: ______________________________ 7/12/2020  Karen Andersen RN

## 2020-07-12 NOTE — PROGRESS NOTES
Outpatient Infusion Center Progress Note    5392  Pt arrived in stable condition for IV abx    Assessment unchanged, no new concerns voiced. R arm DL PICC line flushed with positive blood return noted. Patient Vitals for the past 12 hrs:   Temp Pulse Resp BP   07/12/20 1055  (!) 50 16 113/64   07/12/20 0920 97.6 °F (36.4 °C) (!) 55 18 108/58     The following medications administered:  Medications Administered     gentamicin (GARAMYCIN) 240 mg in 0.9% sodium chloride 100 mL IVPB     Admin Date  07/12/2020 Action  New Bag Dose  240 mg Rate  100 mL/hr Route  IntraVENous Administered By  Excell Scheuermann, RN          sodium chloride (NS) flush 10-40 mL     Admin Date  07/12/2020 Action  Given Dose  10 mL Route  IntraVENous Administered By  Excell Scheuermann, RN           Admin Date  07/12/2020 Action  Given Dose  10 mL Route  IntraVENous Administered By  Excell Scheuermann, RN              Pt tolerated treatment well. PICC line flushed and removed per order. Pt monitored in reclined position x 30 mins. Pt provided with discharge instructions. Pt verbalized understanding. 1055  Pt discharged in no acute distress. Next appointment:    No future appointments.

## 2020-08-03 ENCOUNTER — OFFICE VISIT (OUTPATIENT)
Dept: INTERNAL MEDICINE CLINIC | Age: 70
End: 2020-08-03
Payer: MEDICARE

## 2020-08-03 VITALS
SYSTOLIC BLOOD PRESSURE: 92 MMHG | BODY MASS INDEX: 30.31 KG/M2 | WEIGHT: 200 LBS | HEIGHT: 68 IN | DIASTOLIC BLOOD PRESSURE: 56 MMHG

## 2020-08-03 DIAGNOSIS — I26.99 BILATERAL PULMONARY EMBOLISM (HCC): ICD-10-CM

## 2020-08-03 DIAGNOSIS — Z00.00 MEDICARE ANNUAL WELLNESS VISIT, SUBSEQUENT: Primary | ICD-10-CM

## 2020-08-03 PROCEDURE — G0444 DEPRESSION SCREEN ANNUAL: HCPCS | Performed by: PHYSICIAN ASSISTANT

## 2020-08-03 PROCEDURE — 1090F PRES/ABSN URINE INCON ASSESS: CPT | Performed by: PHYSICIAN ASSISTANT

## 2020-08-03 PROCEDURE — 99213 OFFICE O/P EST LOW 20 MIN: CPT | Performed by: PHYSICIAN ASSISTANT

## 2020-08-03 PROCEDURE — G0439 PPPS, SUBSEQ VISIT: HCPCS | Performed by: PHYSICIAN ASSISTANT

## 2020-08-03 PROCEDURE — G8754 DIAS BP LESS 90: HCPCS | Performed by: PHYSICIAN ASSISTANT

## 2020-08-03 PROCEDURE — G8752 SYS BP LESS 140: HCPCS | Performed by: PHYSICIAN ASSISTANT

## 2020-08-03 PROCEDURE — G8417 CALC BMI ABV UP PARAM F/U: HCPCS | Performed by: PHYSICIAN ASSISTANT

## 2020-08-03 PROCEDURE — G8432 DEP SCR NOT DOC, RNG: HCPCS | Performed by: PHYSICIAN ASSISTANT

## 2020-08-03 PROCEDURE — G8399 PT W/DXA RESULTS DOCUMENT: HCPCS | Performed by: PHYSICIAN ASSISTANT

## 2020-08-03 PROCEDURE — G8427 DOCREV CUR MEDS BY ELIG CLIN: HCPCS | Performed by: PHYSICIAN ASSISTANT

## 2020-08-03 PROCEDURE — G8536 NO DOC ELDER MAL SCRN: HCPCS | Performed by: PHYSICIAN ASSISTANT

## 2020-08-03 PROCEDURE — G9899 SCRN MAM PERF RSLTS DOC: HCPCS | Performed by: PHYSICIAN ASSISTANT

## 2020-08-03 PROCEDURE — 3017F COLORECTAL CA SCREEN DOC REV: CPT | Performed by: PHYSICIAN ASSISTANT

## 2020-08-03 PROCEDURE — 1101F PT FALLS ASSESS-DOCD LE1/YR: CPT | Performed by: PHYSICIAN ASSISTANT

## 2020-08-03 RX ORDER — ACETAMINOPHEN 500 MG
500 TABLET ORAL AS NEEDED
COMMUNITY
Start: 2020-07-29

## 2020-08-03 RX ORDER — APIXABAN 5 MG (74)
KIT ORAL
COMMUNITY
Start: 2020-07-29 | End: 2021-04-21

## 2020-08-03 NOTE — PROGRESS NOTES
This is the Subsequent Medicare Annual Wellness Exam, performed 12 months or more after the Initial AWV or the last Subsequent AWV  Health Maintenance Due   Topic Date Due    A1C test (Diabetic or Prediabetic)  07/21/1962    Shingrix Vaccine Age 50> (1 of 2) 07/21/2002    GLAUCOMA SCREENING Q2Y  10/23/2019    Influenza Age 9 to Adult  08/01/2020    Medicare Yearly Exam  07/09/2020     Depression Risk Factor Screening:     3 most recent PHQ Screens 8/3/2020   Little interest or pleasure in doing things Not at all   Feeling down, depressed, irritable, or hopeless Not at all   Total Score PHQ 2 0     Alcohol Risk Factor Screening:    reports current alcohol use of about 0.8 standard drinks of alcohol per week. Functional Ability and Level of Safety:   Hearing Loss  Hearing is good. Activities of Daily Living  The home contains: handrails  Patient does total self care - has some temporary help from family with transportation/shopping/chores s/p recent PE. Fall Risk  Fall Risk Assessment, last 12 mths 7/7/2020   Able to walk? Yes   Fall in past 12 months?  No   Fall with injury? -   Number of falls in past 12 months -   Fall Risk Score -       Abuse Screen  Patient is not abused    Cognitive Screening   Evaluation of Cognitive Function:  Has your family/caregiver stated any concerns about your memory: no     Patient Care Team   Patient Care Team:  Sylvia Sahni as PCP - General (Physician Assistant)  Ivan Ledezma PA-C as PCP - [address] [place] [address]  [facility] Provider  Shobha Swann, CHEVY as Ambulatory Care Manager (Internal Medicine)    Assessment/Plan   Education and counseling provided:  Are appropriate based on today's review and evaluation    Extended Emergency Contact Information  Primary Emergency Contact: Jose Schultz Phone: [phone]  Mobile Phone: [phone]  Relation: Daughter  Secondary Emergency Contact: Marcela Farmer Phone: [phone]  Mobile Phone: [phone]  Relation: Son    ASHLEY - has one at home, but it is old - requests new form. I have reviewed the patient's medical history in detail and updated the computerized patient record. History     Past Medical History:   Diagnosis Date    Arthritis     ALL OVER    GERD (gastroesophageal reflux disease)     Gout     HTN (hypertension)     Hypercholesterolemia     Nephrolithiasis     Neuropathy     Polyclonal gammopathy     Pseudotumor (inflammatory) of orbit     Stroke (HCC)     TIA    TIA (transient ischemic attack)     complicated migraine    Urosepsis       Past Surgical History:   Procedure Laterality Date    COLONOSCOPY N/A 11/1/2018    COLONOSCOPY performed by Nancy Roman MD at [facility] HX CATARACT REMOVAL Bilateral 2017    HX COLONOSCOPY  2008    HX KNEE REPLACEMENT Right     HX LAP CHOLECYSTECTOMY      HX LITHOTRIPSY Right 07/13/2020    HX ORTHOPAEDIC Bilateral     BILATERAL HIP REPLACEMENTS    HX ORTHOPAEDIC      SPUR RIGHT FOOT    HX LYLE AND BSO      HX UROLOGICAL  2012    cysto for kidney stones (was septic)     Current Outpatient Medications   Medication Sig Dispense Refill    acetaminophen (TYLENOL) 500 mg tablet Take 500 mg by mouth as needed.  Eliquis DVT-PE Treat 30D Start 5 mg (74 tabs) starter pack       potassium citrate (UROCIT-K) 15 mEq TbER tablet TAKE 1 TABLET BY MOUTH TWICE DAILY      chlorthalidone (HYGROTEN) 25 mg tablet TAKE 1 TABLET BY MOUTH TWICE DAILY      atenoloL (TENORMIN) 50 mg tablet Take 1 Tab by mouth daily. 30 Tab 5    simvastatin (ZOCOR) 10 mg tablet Take 1 Tab by mouth daily. 90 Tab 1    FLUoxetine (PROZAC) 20 mg capsule Take 1 Cap by mouth daily. 90 Cap 1    HYDROmorphone (DILAUDID) 2 mg tablet       lisinopriL (PRINIVIL, ZESTRIL) 40 mg tablet Take 1 Tab by mouth daily. (Patient not taking: Reported on 8/3/2020) 90 Tab 1    aspirin delayed-release 81 mg tablet Take  by mouth daily.        Allergies   Allergen Reactions    Percocet [Oxycodone-Acetaminophen] Other (comments)     Sharp pain in neck-headache     Family History   Problem Relation Age of Onset    Hypertension Mother     Stroke Mother     Lupus Daughter      Social History     Tobacco Use    Smoking status: Former Smoker     Packs/day: 0.25     Years: 20.00     Pack years: 5.00     Last attempt to quit: 5/3/2020     Years since quittin.2    Smokeless tobacco: Never Used   Substance Use Topics    Alcohol use:  Yes     Alcohol/week: 0.8 standard drinks     Types: 1 Glasses of wine per week     Patient Active Problem List   Diagnosis Code    Nephrolithiasis N20.0    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

## 2020-08-03 NOTE — ACP (ADVANCE CARE PLANNING)
Advance Care Planning (ACP) Provider Note - Comprehensive     Date of ACP Conversation: 08/03/20  Persons included in Conversation:  patient  Length of ACP Conversation in minutes:  <16 minutes (Non-Billable)    Authorized Decision Maker (if patient is incapable of making informed decisions): This person is:  Extended Emergency Contact Information  Primary Emergency Contact: Jose Schultz Phone: [phone]  Mobile Phone: [phone]  Relation: Daughter  Secondary Emergency Contact: Marcela Cruz Phone: [phone]  Mobile Phone: [phone]  Relation: Son        General ACP for ALL Patients with Decision Making Capacity:   Importance of advance care planning, including choosing a healthcare agent to communicate patient's healthcare decisions if patient lost the ability to make decisions, such as after a sudden illness or accident  Understanding of the healthcare agent role was assessed and information provided    Review of Existing Advance Directive:  None - form given today.      For Serious or Chronic Illness:  Understanding of medical condition      Interventions Provided:  Recommended completion of Advance Directive form after review of ACP materials and conversation with prospective healthcare agent   Recommended communicating the plan and making copies for the healthcare agent, personal physician, and others as appropriate (e.g., health system)  Recommended review of completed ACP document annually or upon change in health status

## 2020-08-03 NOTE — PROGRESS NOTES
Chief Complaint   Patient presents with    Transitions Of Care     f/u [facility]  pulmonary lung clots. 1. Have you been to the ER, urgent care clinic since your last visit? Hospitalized since your last visit? No    2. Have you seen or consulted any other health care providers outside of the [address] [place] [address] since your last visit? Include any pap smears or colon screening. Dr. Carmela Linares lithotripsy 7/2020.

## 2020-08-03 NOTE — PROGRESS NOTES
Yolis Main is a 78 y.o. female who was seen by synchronous (real-time) audio-video technology on 8/3/2020    Consent: Yolis Main, who was seen by synchronous (real-time) audio-video technology, and/or her healthcare decision maker, is aware that this patient-initiated, Telehealth encounter on 8/3/2020 is a billable service, with coverage as determined by her insurance carrier. She is aware that she may receive a bill and has provided verbal consent to proceed: YES    Assessment & Plan:   Diagnoses and all orders for this visit:    1. Medicare annual wellness visit, subsequent  See note    2. Bilateral pulmonary embolism ([address]  Encouraged pt to get adequate rest while recovering. Follow up as planned with [facility]. Subjective:   Yolis Main is a 78 y.o. female who was seen for Transitions Of Care (f/u [facility] pulmonary lung clots. )    Hospitalized at [facility] for B PE 7/26-7/29. .   Started on Eliquis 10 mg BID. Seeing Dr. Daisy Huang at [facility] in 2-3 months. Plan: Elliquis x 6 months. Stent follow up with Urology tomorrow. Health Maintenance Due   Topic Date Due    A1C test (Diabetic or Prediabetic)  07/21/1962    Shingrix Vaccine Age 50> (1 of 2) 07/21/2002    GLAUCOMA SCREENING Q2Y  10/23/2019    Influenza Age 9 to Adult  08/01/2020    Medicare Yearly Exam  07/09/2020       Review of Systems   Constitutional: Positive for malaise/fatigue. Negative for fever. Respiratory: Negative for shortness of breath. Psychiatric/Behavioral: Negative for depression.       Objective:     Patient-Reported Vitals 7/7/2020   Patient-Reported Weight 214lb   Patient-Reported Pulse 57   Patient-Reported Systolic  487   Patient-Reported Diastolic 69      Visit Vitals  BP 92/56 (BP 1 Location: Left arm, BP Patient Position: Sitting)   Ht 5' 8\" (1.727 m)   Wt 200 lb (90.7 kg)   BMI 30.41 kg/m²     General: alert, cooperative, no distress   Mental  status: normal mood, behavior, speech, dress, motor activity, and thought processes, able to follow commands   HENT: NCAT   Neck: no visualized mass   Resp: no respiratory distress   Neuro: no gross deficits   Skin: no discoloration or lesions of concern on visible areas   Psychiatric: normal affect, consistent with stated mood, no evidence of hallucinations     We discussed the expected course, resolution and complications of the diagnosis(es) in detail. Medication risks, benefits, costs, interactions, and alternatives were discussed as indicated. I advised her to contact the office if her condition worsens, changes or fails to improve as anticipated. She expressed understanding with the diagnosis(es) and plan. Royal Richardson is a 78 y.o. female who was evaluated by a video visit encounter for concerns as above. Patient identification was verified prior to start of the visit. A caregiver was present when appropriate. Due to this being a TeleHealth encounter (During GMFGW-00 public health emergency), evaluation of the following organ systems was limited: Vitals/Constitutional/EENT/Resp/CV/GI//MS/Neuro/Skin/Heme-Lymph-Imm. Pursuant to the emergency declaration under the [facility] waiver authority and the [employer] Act, this Virtual  Visit was conducted, with patient's (and/or legal guardian's) consent, to reduce the patient's risk of exposure to COVID-19 and provide necessary medical care. Services were provided through a video synchronous discussion virtually to substitute for in-person clinic visit. Patient and provider were located at their individual homes.       Alisson Keita PA-C

## 2020-08-19 DIAGNOSIS — F43.21 GRIEF: ICD-10-CM

## 2020-08-19 RX ORDER — APIXABAN 5 MG (74)
KIT ORAL
Qty: 1 DOSE PACK | Status: CANCELLED | OUTPATIENT
Start: 2020-08-19

## 2020-08-19 RX ORDER — FLUOXETINE HYDROCHLORIDE 20 MG/1
CAPSULE ORAL
Qty: 90 CAP | Refills: 1 | Status: SHIPPED | OUTPATIENT
Start: 2020-08-19 | End: 2021-05-24

## 2020-08-19 NOTE — TELEPHONE ENCOUNTER
Please ask her to request this from [facility], as they are managing her PE and they will determine when she needs to stop it.

## 2020-08-19 NOTE — TELEPHONE ENCOUNTER
Patient says she has a starter kit for [place] and  is about to run out. She has to be on it 3 months before her appt with Pulmonologist in October. She is requesting a refill sent to [facility].

## 2020-12-01 ENCOUNTER — TRANSCRIBE ORDER (OUTPATIENT)
Dept: SCHEDULING | Age: 70
End: 2020-12-01

## 2020-12-01 DIAGNOSIS — Z12.31 VISIT FOR SCREENING MAMMOGRAM: Primary | ICD-10-CM

## 2020-12-20 DIAGNOSIS — I10 ESSENTIAL HYPERTENSION: ICD-10-CM

## 2020-12-21 RX ORDER — ATENOLOL 50 MG/1
TABLET ORAL
Qty: 30 TAB | Refills: 2 | Status: SHIPPED | OUTPATIENT
Start: 2020-12-21 | End: 2021-03-22

## 2021-01-10 DIAGNOSIS — E78.00 PURE HYPERCHOLESTEROLEMIA: ICD-10-CM

## 2021-01-11 RX ORDER — SIMVASTATIN 10 MG/1
TABLET, FILM COATED ORAL
Qty: 90 TAB | Refills: 1 | Status: SHIPPED | OUTPATIENT
Start: 2021-01-11 | End: 2021-05-04 | Stop reason: SDUPTHER

## 2021-02-25 ENCOUNTER — HOSPITAL ENCOUNTER (OUTPATIENT)
Dept: MAMMOGRAPHY | Age: 71
Discharge: HOME | End: 2021-02-25
Attending: PHYSICIAN ASSISTANT

## 2021-02-25 DIAGNOSIS — Z12.31 VISIT FOR SCREENING MAMMOGRAM: ICD-10-CM

## 2021-04-21 ENCOUNTER — OFFICE VISIT (OUTPATIENT)
Dept: INTERNAL MEDICINE CLINIC | Age: 71
End: 2021-04-21
Payer: MEDICARE

## 2021-04-21 VITALS
DIASTOLIC BLOOD PRESSURE: 54 MMHG | HEART RATE: 52 BPM | RESPIRATION RATE: 18 BRPM | HEIGHT: 68 IN | TEMPERATURE: 98 F | WEIGHT: 223 LBS | OXYGEN SATURATION: 96 % | SYSTOLIC BLOOD PRESSURE: 113 MMHG | BODY MASS INDEX: 33.8 KG/M2

## 2021-04-21 DIAGNOSIS — I10 ESSENTIAL HYPERTENSION: ICD-10-CM

## 2021-04-21 DIAGNOSIS — E78.00 PURE HYPERCHOLESTEROLEMIA: ICD-10-CM

## 2021-04-21 DIAGNOSIS — E66.01 SEVERE OBESITY (HCC): Primary | ICD-10-CM

## 2021-04-21 DIAGNOSIS — R73.01 ELEVATED FASTING GLUCOSE: ICD-10-CM

## 2021-04-21 DIAGNOSIS — F43.21 GRIEF: ICD-10-CM

## 2021-04-21 PROCEDURE — G9899 SCRN MAM PERF RSLTS DOC: HCPCS | Performed by: PHYSICIAN ASSISTANT

## 2021-04-21 PROCEDURE — G8754 DIAS BP LESS 90: HCPCS | Performed by: PHYSICIAN ASSISTANT

## 2021-04-21 PROCEDURE — G8752 SYS BP LESS 140: HCPCS | Performed by: PHYSICIAN ASSISTANT

## 2021-04-21 PROCEDURE — G8432 DEP SCR NOT DOC, RNG: HCPCS | Performed by: PHYSICIAN ASSISTANT

## 2021-04-21 PROCEDURE — 3017F COLORECTAL CA SCREEN DOC REV: CPT | Performed by: PHYSICIAN ASSISTANT

## 2021-04-21 PROCEDURE — G8427 DOCREV CUR MEDS BY ELIG CLIN: HCPCS | Performed by: PHYSICIAN ASSISTANT

## 2021-04-21 PROCEDURE — G8417 CALC BMI ABV UP PARAM F/U: HCPCS | Performed by: PHYSICIAN ASSISTANT

## 2021-04-21 PROCEDURE — G0463 HOSPITAL OUTPT CLINIC VISIT: HCPCS | Performed by: PHYSICIAN ASSISTANT

## 2021-04-21 PROCEDURE — 1090F PRES/ABSN URINE INCON ASSESS: CPT | Performed by: PHYSICIAN ASSISTANT

## 2021-04-21 PROCEDURE — G8399 PT W/DXA RESULTS DOCUMENT: HCPCS | Performed by: PHYSICIAN ASSISTANT

## 2021-04-21 PROCEDURE — 1101F PT FALLS ASSESS-DOCD LE1/YR: CPT | Performed by: PHYSICIAN ASSISTANT

## 2021-04-21 PROCEDURE — 99214 OFFICE O/P EST MOD 30 MIN: CPT | Performed by: PHYSICIAN ASSISTANT

## 2021-04-21 PROCEDURE — G8536 NO DOC ELDER MAL SCRN: HCPCS | Performed by: PHYSICIAN ASSISTANT

## 2021-04-21 RX ORDER — LISINOPRIL 20 MG/1
20 TABLET ORAL DAILY
Qty: 90 TAB | Refills: 1 | Status: SHIPPED | OUTPATIENT
Start: 2021-04-21 | End: 2021-05-21 | Stop reason: DRUGHIGH

## 2021-04-21 NOTE — PROGRESS NOTES
Amna Emerson is a 78 y.o. female  Chief Complaint   Patient presents with    Medication Refill     Visit Vitals  BP (!) 113/54 (BP 1 Location: Left upper arm, BP Patient Position: Sitting, BP Cuff Size: Large adult)   Pulse (!) 52   Temp 98 °F (36.7 °C) (Temporal)   Resp 18   Ht 5' 8\" (1.727 m)   Wt 223 lb (101.2 kg)   SpO2 96%   BMI 33.91 kg/m²      Health Maintenance Due   Topic Date Due    A1C test (Diabetic or Prediabetic)  Never done    COVID-19 Vaccine (1) Never done    Shingrix Vaccine Age 50> (1 of 2) Never done    Lipid Screen  01/16/2021       HPI  HTN Follow up - BP controlled: Possibly over treated. BP Readings from Last 3 Encounters:   04/21/21 (!) 113/54   08/03/20 92/56   07/12/20 113/64     Currently taking:   Key CAD CHF Meds             atenoloL (TENORMIN) 50 mg tablet (Taking) TAKE 1 TABLET BY MOUTH EVERY DAY    simvastatin (ZOCOR) 10 mg tablet (Taking) Take 1 tablet by mouth once daily    chlorthalidone (HYGROTEN) 25 mg tablet (Taking) TAKE 1 TABLET BY MOUTH TWICE DAILY    lisinopriL (PRINIVIL, ZESTRIL) 40 mg tablet (Taking) Take 1 Tab by mouth daily. aspirin delayed-release 81 mg tablet (Taking) Take  by mouth daily. Fainted once in the bathroom a couple of months ago. Feeling dizzy occasionally. Pre-DM II -   Lab Results   Component Value Date/Time    Cholesterol, total 139 01/16/2020 10:42 AM    HDL Cholesterol 34 01/16/2020 10:42 AM    LDL, calculated 80.2 01/16/2020 10:42 AM    VLDL, calculated 24.8 01/16/2020 10:42 AM    Triglyceride 124 01/16/2020 10:42 AM    CHOL/HDL Ratio 4.1 01/16/2020 10:42 AM     Currently taking:   Key Antihyperglycemic Medications     Patient is on no antihyperglycemic meds. Planning to change diet/exercise to lose weight. Wt Readings from Last 3 Encounters:   04/21/21 223 lb (101.2 kg)   08/03/20 200 lb (90.7 kg)   07/08/20 218 lb (98.9 kg)     Feels emotionally well.   passed away last year, but has good family support and is feeling calm. Planning a trip to [address] with friends soon. ROS  Review of Systems   Respiratory: Negative for shortness of breath. Cardiovascular: Negative for chest pain and palpitations. Neurological: Positive for dizziness and loss of consciousness. Negative for weakness. Psychiatric/Behavioral: Negative for depression. EXAM  Physical Exam  Vitals signs and nursing note reviewed. Constitutional:       General: She is not in acute distress. Appearance: She is well-developed. HENT:      Head: Normocephalic and atraumatic. Neck:      Musculoskeletal: Neck supple. Vascular: No JVD. Cardiovascular:      Rate and Rhythm: Normal rate and regular rhythm. Heart sounds: Normal heart sounds. Pulmonary:      Effort: Pulmonary effort is normal. No respiratory distress. Breath sounds: Normal breath sounds. Skin:     General: Skin is warm and dry. Neurological:      Mental Status: She is alert and oriented to person, place, and time. Psychiatric:         Mood and Affect: Mood normal.         Behavior: Behavior normal.         Thought Content: Thought content normal.         Judgment: Judgment normal.       ASSESSMENT/PLAN  Encounter Diagnoses     ICD-10-CM ICD-9-CM   1. Severe obesity ([address]  E66.01 278.01   2. Essential hypertension  I10 401.9   3. Elevated fasting glucose  R73.01 790.21   4. Pure hypercholesterolemia  E78.00 272.0   5. Grief  F43.21 309.0     Orders Placed This Encounter    HEMOGLOBIN A1C WITH EAG    LIPID PANEL    METABOLIC PANEL, COMPREHENSIVE    Decrease dose to: lisinopriL (PRINIVIL, ZESTRIL) 20 mg tablet     Discussed diet/exercise and options for/importance of losing weight. Encouraged self care.

## 2021-04-21 NOTE — PROGRESS NOTES
1. Have you been to the ER, urgent care clinic since your last visit? Hospitalized since your last visit? No    2. Have you seen or consulted any other health care providers outside of the [address] [place] [address] since your last visit? Include any pap smears or colon screening.  No   Chief Complaint   Patient presents with    Medication Refill

## 2021-05-04 DIAGNOSIS — E78.00 PURE HYPERCHOLESTEROLEMIA: ICD-10-CM

## 2021-05-04 RX ORDER — SIMVASTATIN 10 MG/1
TABLET, FILM COATED ORAL
Qty: 90 TAB | Refills: 1 | Status: SHIPPED | OUTPATIENT
Start: 2021-05-04 | End: 2021-06-21 | Stop reason: SDUPTHER

## 2021-05-04 NOTE — TELEPHONE ENCOUNTER
Reason for call: She would like to know if she should discontinue Simvastatin and start taking Lisinopril.        Callback required yes/no and why: Yes       Best contact number(s):[phone]       khalida

## 2021-05-10 DIAGNOSIS — I10 ESSENTIAL HYPERTENSION: ICD-10-CM

## 2021-05-13 RX ORDER — ATENOLOL 50 MG/1
TABLET ORAL
Qty: 30 TAB | Refills: 5 | Status: SHIPPED | OUTPATIENT
Start: 2021-05-13 | End: 2021-06-21 | Stop reason: SDUPTHER

## 2021-05-21 ENCOUNTER — OFFICE VISIT (OUTPATIENT)
Dept: INTERNAL MEDICINE CLINIC | Age: 71
End: 2021-05-21
Payer: MEDICARE

## 2021-05-21 VITALS
WEIGHT: 230.6 LBS | RESPIRATION RATE: 16 BRPM | HEIGHT: 68 IN | HEART RATE: 51 BPM | SYSTOLIC BLOOD PRESSURE: 113 MMHG | TEMPERATURE: 98.2 F | OXYGEN SATURATION: 100 % | BODY MASS INDEX: 34.95 KG/M2 | DIASTOLIC BLOOD PRESSURE: 60 MMHG

## 2021-05-21 DIAGNOSIS — I10 ESSENTIAL HYPERTENSION: Primary | ICD-10-CM

## 2021-05-21 PROCEDURE — G8399 PT W/DXA RESULTS DOCUMENT: HCPCS | Performed by: PHYSICIAN ASSISTANT

## 2021-05-21 PROCEDURE — G8752 SYS BP LESS 140: HCPCS | Performed by: PHYSICIAN ASSISTANT

## 2021-05-21 PROCEDURE — G0463 HOSPITAL OUTPT CLINIC VISIT: HCPCS | Performed by: PHYSICIAN ASSISTANT

## 2021-05-21 PROCEDURE — 1090F PRES/ABSN URINE INCON ASSESS: CPT | Performed by: PHYSICIAN ASSISTANT

## 2021-05-21 PROCEDURE — 1101F PT FALLS ASSESS-DOCD LE1/YR: CPT | Performed by: PHYSICIAN ASSISTANT

## 2021-05-21 PROCEDURE — 99213 OFFICE O/P EST LOW 20 MIN: CPT | Performed by: PHYSICIAN ASSISTANT

## 2021-05-21 PROCEDURE — G8754 DIAS BP LESS 90: HCPCS | Performed by: PHYSICIAN ASSISTANT

## 2021-05-21 PROCEDURE — G8427 DOCREV CUR MEDS BY ELIG CLIN: HCPCS | Performed by: PHYSICIAN ASSISTANT

## 2021-05-21 PROCEDURE — G8536 NO DOC ELDER MAL SCRN: HCPCS | Performed by: PHYSICIAN ASSISTANT

## 2021-05-21 PROCEDURE — G9899 SCRN MAM PERF RSLTS DOC: HCPCS | Performed by: PHYSICIAN ASSISTANT

## 2021-05-21 PROCEDURE — G8417 CALC BMI ABV UP PARAM F/U: HCPCS | Performed by: PHYSICIAN ASSISTANT

## 2021-05-21 PROCEDURE — 3017F COLORECTAL CA SCREEN DOC REV: CPT | Performed by: PHYSICIAN ASSISTANT

## 2021-05-21 PROCEDURE — G8432 DEP SCR NOT DOC, RNG: HCPCS | Performed by: PHYSICIAN ASSISTANT

## 2021-05-21 RX ORDER — LISINOPRIL 10 MG/1
10 TABLET ORAL DAILY
Qty: 30 TABLET | Refills: 2 | Status: SHIPPED | OUTPATIENT
Start: 2021-05-21 | End: 2021-06-21 | Stop reason: SDUPTHER

## 2021-05-21 NOTE — PROGRESS NOTES
Josephine Davis is a 78 y.o. female  Chief Complaint   Patient presents with    Hypertension     follow up     Visit Vitals  /60 (BP 1 Location: Left upper arm, BP Patient Position: Sitting, BP Cuff Size: Adult)   Pulse (!) 51   Temp 98.2 °F (36.8 °C) (Oral)   Resp 16   Ht 5' 8\" (1.727 m)   Wt 230 lb 9.6 oz (104.6 kg)   SpO2 100%   BMI 35.06 kg/m²      Health Maintenance Due   Topic Date Due    COVID-19 Vaccine (1) Never done    Shingrix Vaccine Age 50> (1 of 2) Never done       HPI  HTN Follow up - BP remains slightly over-controlled. Decreased dose to Lisinopril 20 mg at last visit in April. Pt reports that she still feels dizzy occasionally, and BP is staying in 1teens/60s at home. BP Readings from Last 3 Encounters:   05/21/21 113/60   04/21/21 (!) 113/54   08/03/20 92/56     Currently taking:   Key CAD CHF Meds             atenoloL (TENORMIN) 50 mg tablet (Taking) TAKE 1 TABLET BY MOUTH EVERY DAY    simvastatin (ZOCOR) 10 mg tablet (Taking) Take 1 tablet by mouth once daily    lisinopriL (PRINIVIL, ZESTRIL) 20 mg tablet (Taking) Take 1 Tab by mouth daily. chlorthalidone (HYGROTEN) 25 mg tablet (Taking) TAKE 1 TABLET BY MOUTH TWICE DAILY    aspirin delayed-release 81 mg tablet (Taking) Take  by mouth daily. ROS  Review of Systems   Respiratory: Negative for shortness of breath. Cardiovascular: Negative for chest pain and palpitations. Neurological: Positive for dizziness. Negative for loss of consciousness and weakness. EXAM  Physical Exam  Vitals and nursing note reviewed. Constitutional:       General: She is not in acute distress. Appearance: She is well-developed. HENT:      Head: Normocephalic and atraumatic. Neck:      Vascular: No JVD. Cardiovascular:      Rate and Rhythm: Normal rate and regular rhythm. Heart sounds: Normal heart sounds. Pulmonary:      Effort: Pulmonary effort is normal. No respiratory distress.       Breath sounds: Normal breath sounds. Musculoskeletal:      Cervical back: Neck supple. Skin:     General: Skin is warm and dry. Neurological:      Mental Status: She is alert and oriented to person, place, and time. Psychiatric:         Mood and Affect: Mood normal.         Behavior: Behavior normal.         Thought Content: Thought content normal.         Judgment: Judgment normal.       ASSESSMENT/PLAN  Encounter Diagnoses     ICD-10-CM ICD-9-CM   1.  Essential hypertension  I10 401.9     Orders Placed This Encounter    Decrease dose to lisinopriL (PRINIVIL, ZESTRIL) 10 mg tablet

## 2021-06-21 ENCOUNTER — OFFICE VISIT (OUTPATIENT)
Dept: INTERNAL MEDICINE CLINIC | Age: 71
End: 2021-06-21
Payer: MEDICARE

## 2021-06-21 VITALS
TEMPERATURE: 97.7 F | OXYGEN SATURATION: 99 % | RESPIRATION RATE: 16 BRPM | SYSTOLIC BLOOD PRESSURE: 107 MMHG | HEIGHT: 68 IN | WEIGHT: 226 LBS | HEART RATE: 44 BPM | DIASTOLIC BLOOD PRESSURE: 61 MMHG | BODY MASS INDEX: 34.25 KG/M2

## 2021-06-21 DIAGNOSIS — I10 ESSENTIAL HYPERTENSION: ICD-10-CM

## 2021-06-21 DIAGNOSIS — E78.00 PURE HYPERCHOLESTEROLEMIA: ICD-10-CM

## 2021-06-21 DIAGNOSIS — E66.01 SEVERE OBESITY (HCC): Primary | ICD-10-CM

## 2021-06-21 PROCEDURE — G0463 HOSPITAL OUTPT CLINIC VISIT: HCPCS | Performed by: PHYSICIAN ASSISTANT

## 2021-06-21 PROCEDURE — G8754 DIAS BP LESS 90: HCPCS | Performed by: PHYSICIAN ASSISTANT

## 2021-06-21 PROCEDURE — G8752 SYS BP LESS 140: HCPCS | Performed by: PHYSICIAN ASSISTANT

## 2021-06-21 PROCEDURE — G8399 PT W/DXA RESULTS DOCUMENT: HCPCS | Performed by: PHYSICIAN ASSISTANT

## 2021-06-21 PROCEDURE — 3017F COLORECTAL CA SCREEN DOC REV: CPT | Performed by: PHYSICIAN ASSISTANT

## 2021-06-21 PROCEDURE — G8536 NO DOC ELDER MAL SCRN: HCPCS | Performed by: PHYSICIAN ASSISTANT

## 2021-06-21 PROCEDURE — 1101F PT FALLS ASSESS-DOCD LE1/YR: CPT | Performed by: PHYSICIAN ASSISTANT

## 2021-06-21 PROCEDURE — 99214 OFFICE O/P EST MOD 30 MIN: CPT | Performed by: PHYSICIAN ASSISTANT

## 2021-06-21 PROCEDURE — G9899 SCRN MAM PERF RSLTS DOC: HCPCS | Performed by: PHYSICIAN ASSISTANT

## 2021-06-21 PROCEDURE — 1090F PRES/ABSN URINE INCON ASSESS: CPT | Performed by: PHYSICIAN ASSISTANT

## 2021-06-21 PROCEDURE — G8427 DOCREV CUR MEDS BY ELIG CLIN: HCPCS | Performed by: PHYSICIAN ASSISTANT

## 2021-06-21 PROCEDURE — G8417 CALC BMI ABV UP PARAM F/U: HCPCS | Performed by: PHYSICIAN ASSISTANT

## 2021-06-21 PROCEDURE — G8432 DEP SCR NOT DOC, RNG: HCPCS | Performed by: PHYSICIAN ASSISTANT

## 2021-06-21 RX ORDER — ATENOLOL 50 MG/1
50 TABLET ORAL DAILY
Qty: 90 TABLET | Refills: 1 | Status: SHIPPED | OUTPATIENT
Start: 2021-06-21 | End: 2022-01-31

## 2021-06-21 RX ORDER — SIMVASTATIN 10 MG/1
TABLET, FILM COATED ORAL
Qty: 90 TABLET | Refills: 1 | Status: SHIPPED | OUTPATIENT
Start: 2021-06-21 | End: 2022-03-14

## 2021-06-21 RX ORDER — LISINOPRIL 10 MG/1
10 TABLET ORAL DAILY
Qty: 90 TABLET | Refills: 1 | Status: SHIPPED | OUTPATIENT
Start: 2021-06-21 | End: 2021-08-27 | Stop reason: SDUPTHER

## 2021-06-21 NOTE — PROGRESS NOTES
Jacey Tierney is a 78 y.o. female  Chief Complaint   Patient presents with    Cholesterol Problem     follow up    Hypertension     follow up     Visit Vitals  /61   Pulse (!) 44   Temp 97.7 °F (36.5 °C) (Temporal)   Resp 16   Ht 5' 8\" (1.727 m)   Wt 226 lb (102.5 kg)   SpO2 99%   BMI 34.36 kg/m²      Health Maintenance Due   Topic Date Due    COVID-19 Vaccine (1) Never done    Shingrix Vaccine Age 50> (1 of 2) Never done       HPI  HTN & cholesterol Follow up - BP controlled:  BP Readings from Last 3 Encounters:   06/21/21 107/61   05/21/21 113/60   04/21/21 (!) 113/54     Currently taking:   Key CAD CHF Meds             lisinopriL (PRINIVIL, ZESTRIL) 10 mg tablet (Taking) Take 1 Tablet by mouth daily. atenoloL (TENORMIN) 50 mg tablet (Taking) TAKE 1 TABLET BY MOUTH EVERY DAY    simvastatin (ZOCOR) 10 mg tablet (Taking) Take 1 tablet by mouth once daily    chlorthalidone (HYGROTEN) 25 mg tablet (Taking) TAKE 1 TABLET BY MOUTH TWICE DAILY    aspirin delayed-release 81 mg tablet (Taking) Take  by mouth daily. Lab Results   Component Value Date/Time    Cholesterol, total 173 04/21/2021 09:14 AM    HDL Cholesterol 46 04/21/2021 09:14 AM    LDL, calculated 97.4 04/21/2021 09:14 AM    VLDL, calculated 29.6 04/21/2021 09:14 AM    Triglyceride 148 04/21/2021 09:14 AM    CHOL/HDL Ratio 3.8 04/21/2021 09:14 AM     Cholesterol is controlled. Wt Readings from Last 3 Encounters:   06/21/21 226 lb (102.5 kg)   05/21/21 230 lb 9.6 oz (104.6 kg)   04/21/21 223 lb (101.2 kg)     Pt has been working on diet for weight loss. ROS  Review of Systems   Respiratory: Negative for shortness of breath. Cardiovascular: Negative for chest pain and palpitations. Neurological: Negative for dizziness, loss of consciousness and weakness. EXAM  Physical Exam  Vitals and nursing note reviewed. Constitutional:       General: She is not in acute distress. Appearance: She is well-developed.    HENT: Head: Normocephalic and atraumatic. Neck:      Vascular: No JVD. Cardiovascular:      Rate and Rhythm: Normal rate and regular rhythm. Heart sounds: Normal heart sounds. Pulmonary:      Effort: Pulmonary effort is normal. No respiratory distress. Breath sounds: Normal breath sounds. Musculoskeletal:      Cervical back: Neck supple. Skin:     General: Skin is warm and dry. Neurological:      Mental Status: She is alert and oriented to person, place, and time. Psychiatric:         Mood and Affect: Mood normal.         Behavior: Behavior normal.         Thought Content: Thought content normal.         Judgment: Judgment normal.       ASSESSMENT/PLAN  Encounter Diagnoses     ICD-10-CM ICD-9-CM   1. Essential hypertension  I10 401.9   2. Pure hypercholesterolemia  E78.00 272.0     Orders Placed This Encounter    lisinopriL (PRINIVIL, ZESTRIL) 10 mg tablet    atenoloL (TENORMIN) 50 mg tablet    simvastatin (ZOCOR) 10 mg tablet   Refills sent  Congratulated pt on weight loss success and encouraged continued efforts!

## 2021-06-21 NOTE — PROGRESS NOTES
1. Have you been to the ER, urgent care clinic since your last visit? Hospitalized since your last visit? No    2. Have you seen or consulted any other health care providers outside of the [address] [place] [address] since your last visit? Include any pap smears or colon screening.  Yes    Chief Complaint   Patient presents with    Cholesterol Problem     follow up    Hypertension     follow up

## 2021-06-28 ENCOUNTER — TELEPHONE (OUTPATIENT)
Dept: INTERNAL MEDICINE CLINIC | Age: 71
End: 2021-06-28

## 2021-06-28 RX ORDER — ZOSTER VACCINE LIVE 19400 [PFU]/.65ML
0.65 INJECTION, POWDER, LYOPHILIZED, FOR SUSPENSION SUBCUTANEOUS ONCE
Qty: 0.65 ML | Refills: 0 | Status: SHIPPED | OUTPATIENT
Start: 2021-06-28 | End: 2021-06-28

## 2021-06-28 NOTE — TELEPHONE ENCOUNTER
Spoke with patient states that her 2nd shingles vaccine was done at [facility]

## 2021-06-28 NOTE — TELEPHONE ENCOUNTER
Please inform pt that I got her shingles vaccine info and she is now due for her second Zostavax. I am sending the Rx to her pharmacy now.

## 2021-07-16 ENCOUNTER — HOSPITAL ENCOUNTER (OUTPATIENT)
Dept: MAMMOGRAPHY | Age: 71
Discharge: HOME | End: 2021-07-16
Attending: PHYSICIAN ASSISTANT
Payer: MEDICARE

## 2021-07-16 PROCEDURE — 77067 SCR MAMMO BI INCL CAD: CPT

## 2021-08-03 PROBLEM — N20.0 NEPHROLITHIASIS: Status: RESOLVED | Noted: 2021-08-03 | Resolved: 2021-08-03

## 2021-08-27 DIAGNOSIS — I10 ESSENTIAL HYPERTENSION: ICD-10-CM

## 2021-08-27 RX ORDER — LISINOPRIL 10 MG/1
10 TABLET ORAL DAILY
Qty: 90 TABLET | Refills: 1 | Status: SHIPPED | OUTPATIENT
Start: 2021-08-27 | End: 2022-01-31

## 2022-01-10 ENCOUNTER — OFFICE VISIT (OUTPATIENT)
Dept: INTERNAL MEDICINE CLINIC | Age: 72
End: 2022-01-10
Payer: MEDICARE

## 2022-01-10 VITALS
HEART RATE: 48 BPM | OXYGEN SATURATION: 98 % | HEIGHT: 68 IN | DIASTOLIC BLOOD PRESSURE: 63 MMHG | BODY MASS INDEX: 34.31 KG/M2 | TEMPERATURE: 98.2 F | WEIGHT: 226.4 LBS | SYSTOLIC BLOOD PRESSURE: 118 MMHG | RESPIRATION RATE: 16 BRPM

## 2022-01-10 DIAGNOSIS — R73.01 ELEVATED FASTING GLUCOSE: ICD-10-CM

## 2022-01-10 DIAGNOSIS — E78.00 PURE HYPERCHOLESTEROLEMIA: ICD-10-CM

## 2022-01-10 DIAGNOSIS — Z00.00 MEDICARE ANNUAL WELLNESS VISIT, SUBSEQUENT: Primary | ICD-10-CM

## 2022-01-10 DIAGNOSIS — N39.41 URGE INCONTINENCE: ICD-10-CM

## 2022-01-10 DIAGNOSIS — I10 ESSENTIAL HYPERTENSION: ICD-10-CM

## 2022-01-10 DIAGNOSIS — E66.01 SEVERE OBESITY (HCC): ICD-10-CM

## 2022-01-10 PROCEDURE — G8427 DOCREV CUR MEDS BY ELIG CLIN: HCPCS | Performed by: PHYSICIAN ASSISTANT

## 2022-01-10 PROCEDURE — G0439 PPPS, SUBSEQ VISIT: HCPCS | Performed by: PHYSICIAN ASSISTANT

## 2022-01-10 PROCEDURE — 3017F COLORECTAL CA SCREEN DOC REV: CPT | Performed by: PHYSICIAN ASSISTANT

## 2022-01-10 PROCEDURE — G8754 DIAS BP LESS 90: HCPCS | Performed by: PHYSICIAN ASSISTANT

## 2022-01-10 PROCEDURE — 99214 OFFICE O/P EST MOD 30 MIN: CPT | Performed by: PHYSICIAN ASSISTANT

## 2022-01-10 PROCEDURE — G8432 DEP SCR NOT DOC, RNG: HCPCS | Performed by: PHYSICIAN ASSISTANT

## 2022-01-10 PROCEDURE — G9899 SCRN MAM PERF RSLTS DOC: HCPCS | Performed by: PHYSICIAN ASSISTANT

## 2022-01-10 PROCEDURE — G8752 SYS BP LESS 140: HCPCS | Performed by: PHYSICIAN ASSISTANT

## 2022-01-10 PROCEDURE — G0463 HOSPITAL OUTPT CLINIC VISIT: HCPCS | Performed by: PHYSICIAN ASSISTANT

## 2022-01-10 PROCEDURE — 1090F PRES/ABSN URINE INCON ASSESS: CPT | Performed by: PHYSICIAN ASSISTANT

## 2022-01-10 PROCEDURE — 1101F PT FALLS ASSESS-DOCD LE1/YR: CPT | Performed by: PHYSICIAN ASSISTANT

## 2022-01-10 PROCEDURE — G8536 NO DOC ELDER MAL SCRN: HCPCS | Performed by: PHYSICIAN ASSISTANT

## 2022-01-10 PROCEDURE — G8417 CALC BMI ABV UP PARAM F/U: HCPCS | Performed by: PHYSICIAN ASSISTANT

## 2022-01-10 PROCEDURE — G8399 PT W/DXA RESULTS DOCUMENT: HCPCS | Performed by: PHYSICIAN ASSISTANT

## 2022-01-10 RX ORDER — MELOXICAM 15 MG/1
TABLET ORAL
COMMUNITY
Start: 2021-10-23

## 2022-01-10 NOTE — PROGRESS NOTES
This is the Subsequent Medicare Annual Wellness Exam, performed 12 months or more after the Initial AWV or the last Subsequent AWV  Health Maintenance Due   Topic Date Due    Medicare Yearly Exam  08/04/2021     Depression Risk Factor Screening:     3 most recent PHQ Screens 8/3/2020   Little interest or pleasure in doing things Not at all   Feeling down, depressed, irritable, or hopeless Not at all   Total Score PHQ 2 0   Score 0 today. Abuse Screen  Patient is not abused    Fall Risk  Fall Risk Assessment, last 12 mths 1/10/2022   Able to walk? Yes   Fall in past 12 months? 0   Do you feel unsteady? 0   Are you worried about falling 1   Is the gait abnormal? 0   Number of falls in past 12 months -   Fall with injury? -       Alcohol Risk Factor Screening:    reports current alcohol use of about 0.8 standard drinks of alcohol per week. Functional Ability and Level of Safety:   Hearing Loss  Hearing is good. Activities of Daily Living  The home contains: handrails  Patient does total self care     Cognitive Screening   Evaluation of Cognitive Function:  Has your family/caregiver stated any concerns about your memory: no     Patient Care Team   Patient Care Team:  Kaley Guadalupe as PCP - General (Physician Assistant)  Lily Marcano PA-C as PCP - [facility] Empaneled Provider  Micah Friedman RN as Ambulatory Care Manager (Internal Medicine)    Assessment/Plan   Education and counseling provided:  Are appropriate based on today's review and evaluation    Extended Emergency Contact Information  Primary Emergency Contact: Jose Antoine Phone: [phone]  Mobile Phone: [phone]  Relation: Daughter  Secondary Emergency Contact: Silvano Acuna  Mobile Phone: [phone]  Relation: Daughter-in-Law    ACP on file. I have reviewed the patient's medical history in detail and updated the computerized patient record.      History     Past Medical History:   Diagnosis Date    Arthritis     ALL OVER    GERD (gastroesophageal reflux disease)     Gout     HTN (hypertension)     Hypercholesterolemia     Nephrolithiasis     Neuropathy     Polyclonal gammopathy     Pseudotumor (inflammatory) of orbit     Stroke (HCC)     TIA    TIA (transient ischemic attack)     complicated migraine    Urosepsis       Past Surgical History:   Procedure Laterality Date    COLONOSCOPY N/A 2018    [facility]hs [facility] HX CATARACT REMOVAL Bilateral 2017    HX COLONOSCOPY  2008    HX KNEE REPLACEMENT Right     HX LAP CHOLECYSTECTOMY      HX LITHOTRIPSY Right 2020    HX ORTHOPAEDIC Bilateral     BILATERAL HIP REPLACEMENTS    HX ORTHOPAEDIC      SPUR RIGHT FOOT    HX LYLE AND BSO      HX UROLOGICAL  2012    cysto for kidney stones (was septic)     Current Outpatient Medications   Medication Sig Dispense Refill    lisinopriL (PRINIVIL, ZESTRIL) 10 mg tablet Take 1 Tablet by mouth daily. 90 Tablet 1    atenoloL (TENORMIN) 50 mg tablet Take 1 Tablet by mouth daily. 90 Tablet 1    simvastatin (ZOCOR) 10 mg tablet Take 1 tablet by mouth once daily 90 Tablet 1    acetaminophen (TYLENOL) 500 mg tablet Take 500 mg by mouth as needed.  potassium citrate (UROCIT-K) 15 mEq TbER tablet TAKE 1 TABLET BY MOUTH TWICE DAILY      chlorthalidone (HYGROTEN) 25 mg tablet TAKE 1 TABLET BY MOUTH TWICE DAILY      aspirin delayed-release 81 mg tablet Take  by mouth daily.       meloxicam (MOBIC) 15 mg tablet        Allergies   Allergen Reactions    Percocet [Oxycodone-Acetaminophen] Other (comments)     Sharp pain in neck-headache     Family History   Problem Relation Age of Onset    Hypertension Mother     Stroke Mother     Lupus Daughter      Social History     Tobacco Use    Smoking status: Former Smoker     Packs/day: 0.25     Years: 20.00     Pack years: 5.00     Quit date: 5/3/2020     Years since quittin.6    Smokeless tobacco: Never Used   Substance Use Topics    Alcohol use:  Yes     Alcohol/week: 0.8 standard drinks     Types: 1 Glasses of wine per week     Patient Active Problem List   Diagnosis Code    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

## 2022-01-10 NOTE — PROGRESS NOTES
Royal Jang is a 73 y.o. female  Chief Complaint   Patient presents with    Cholesterol Problem     follow up    Hypertension     follow up     Visit Vitals  /63   Pulse (!) 48   Temp 98.2 °F (36.8 °C) (Temporal)   Resp 16   Ht 5' 8\" (1.727 m)   Wt 226 lb 6.4 oz (102.7 kg)   SpO2 98%   BMI 34.42 kg/m²      Health Maintenance Due   Topic Date Due    Medicare Yearly Exam  08/04/2021       HPI  Urinary frequency and urge incont x 1 year - having to wear a pad daily. HTN Follow up - BP controlled:  BP Readings from Last 3 Encounters:   01/10/22 118/63   06/21/21 107/61   05/21/21 113/60     Currently taking:   Key CAD CHF Meds             lisinopriL (PRINIVIL, ZESTRIL) 10 mg tablet (Taking) Take 1 Tablet by mouth daily. atenoloL (TENORMIN) 50 mg tablet (Taking) Take 1 Tablet by mouth daily. simvastatin (ZOCOR) 10 mg tablet (Taking) Take 1 tablet by mouth once daily    chlorthalidone (HYGROTEN) 25 mg tablet (Taking) TAKE 1 TABLET BY MOUTH TWICE DAILY    aspirin delayed-release 81 mg tablet (Taking) Take  by mouth daily. Lab Results   Component Value Date/Time    Creatinine 1.05 (H) 04/21/2021 09:14 AM     Lab Results   Component Value Date/Time    Cholesterol, total 173 04/21/2021 09:14 AM    HDL Cholesterol 46 04/21/2021 09:14 AM    LDL, calculated 97.4 04/21/2021 09:14 AM    VLDL, calculated 29.6 04/21/2021 09:14 AM    Triglyceride 148 04/21/2021 09:14 AM    CHOL/HDL Ratio 3.8 04/21/2021 09:14 AM     Wt Readings from Last 3 Encounters:   01/10/22 226 lb 6.4 oz (102.7 kg)   06/21/21 226 lb (102.5 kg)   05/21/21 230 lb 9.6 oz (104.6 kg)     ROS  Review of Systems   Respiratory: Negative for shortness of breath. Cardiovascular: Negative for chest pain and palpitations. Neurological: Negative for dizziness, loss of consciousness and weakness. EXAM  Physical Exam  Vitals and nursing note reviewed. Constitutional:       General: She is not in acute distress.      Appearance: She is well-developed. HENT:      Head: Normocephalic and atraumatic. Neck:      Vascular: No JVD. Cardiovascular:      Rate and Rhythm: Normal rate and regular rhythm. Heart sounds: Normal heart sounds. Pulmonary:      Effort: Pulmonary effort is normal. No respiratory distress. Breath sounds: Normal breath sounds. Musculoskeletal:      Cervical back: Neck supple. Skin:     General: Skin is warm and dry. Neurological:      Mental Status: She is alert and oriented to person, place, and time. Psychiatric:         Mood and Affect: Mood normal.         Behavior: Behavior normal.         Thought Content: Thought content normal.         Judgment: Judgment normal.       ASSESSMENT/PLAN  Encounter Diagnoses     ICD-10-CM ICD-9-CM   1. Severe obesity ([address]  E66.01 278.01   2. Essential hypertension  I10 401.9   3. Pure hypercholesterolemia  E78.00 272.0   4. Elevated fasting glucose  R73.01 790.21   5. Urge incontinence  N39.41 788.31     Orders Placed This Encounter    URINALYSIS W/ REFLEX CULTURE    HEMOGLOBIN A1C WITH EAG    LIPID PANEL    METABOLIC PANEL, COMPREHENSIVE   Kegals & bladder training instructions given  Discussed diet/exercise and options for/importance of losing weight.

## 2022-01-10 NOTE — PROGRESS NOTES
1. Have you been to the ER, urgent care clinic since your last visit? Hospitalized since your last visit? No    2. Have you seen or consulted any other health care providers outside of the [address] [place] [address] since your last visit? Include any pap smears or colon screening.  No   Chief Complaint   Patient presents with    Cholesterol Problem     follow up    Hypertension     follow up

## 2022-01-12 ENCOUNTER — TELEPHONE (OUTPATIENT)
Dept: INTERNAL MEDICINE CLINIC | Age: 72
End: 2022-01-12

## 2022-01-12 RX ORDER — NITROFURANTOIN 25; 75 MG/1; MG/1
100 CAPSULE ORAL 2 TIMES DAILY
Qty: 14 CAPSULE | Refills: 0 | Status: SHIPPED | OUTPATIENT
Start: 2022-01-12 | End: 2022-01-19

## 2022-01-31 DIAGNOSIS — I10 ESSENTIAL HYPERTENSION: ICD-10-CM

## 2022-01-31 RX ORDER — LISINOPRIL 10 MG/1
TABLET ORAL
Qty: 90 TABLET | Refills: 1 | Status: SHIPPED | OUTPATIENT
Start: 2022-01-31 | End: 2022-07-18 | Stop reason: DRUGHIGH

## 2022-01-31 RX ORDER — ATENOLOL 50 MG/1
TABLET ORAL
Qty: 90 TABLET | Refills: 1 | Status: SHIPPED | OUTPATIENT
Start: 2022-01-31 | End: 2022-06-06

## 2022-02-22 ENCOUNTER — DOCUMENTATION ONLY (OUTPATIENT)
Dept: INTERNAL MEDICINE CLINIC | Age: 72
End: 2022-02-22

## 2022-02-22 NOTE — PROGRESS NOTES
Spoke with patient starts that her blood pressure has been ranging from 112/58 to 120/68. Advised per Catherine Jaffe PA-C that she could split her lisinopril in half.  Patient verbalized understanding

## 2022-03-11 DIAGNOSIS — E78.00 PURE HYPERCHOLESTEROLEMIA: ICD-10-CM

## 2022-03-14 RX ORDER — SIMVASTATIN 10 MG/1
TABLET, FILM COATED ORAL
Qty: 90 TABLET | Refills: 1 | Status: SHIPPED | OUTPATIENT
Start: 2022-03-14 | End: 2022-07-18 | Stop reason: SDUPTHER

## 2022-03-18 PROBLEM — R73.01 ELEVATED FASTING GLUCOSE: Status: ACTIVE | Noted: 2020-01-17

## 2022-03-19 PROBLEM — F43.21 GRIEF: Status: ACTIVE | Noted: 2019-05-28

## 2022-03-19 PROBLEM — E78.00 PURE HYPERCHOLESTEROLEMIA: Status: ACTIVE | Noted: 2017-01-19

## 2022-03-19 PROBLEM — I10 ESSENTIAL HYPERTENSION: Status: ACTIVE | Noted: 2017-01-19

## 2022-03-19 PROBLEM — H25.013 CORTICAL AGE-RELATED CATARACT OF BOTH EYES: Status: ACTIVE | Noted: 2017-07-25

## 2022-03-20 PROBLEM — E66.01 SEVERE OBESITY (HCC): Status: ACTIVE | Noted: 2019-03-20

## 2022-03-20 PROBLEM — N20.0 STAGHORN CALCULUS: Status: ACTIVE | Noted: 2019-05-28

## 2022-06-01 DIAGNOSIS — I10 ESSENTIAL HYPERTENSION: ICD-10-CM

## 2022-06-06 RX ORDER — ATENOLOL 50 MG/1
TABLET ORAL
Qty: 90 TABLET | Refills: 1 | Status: SHIPPED | OUTPATIENT
Start: 2022-06-06 | End: 2022-07-18 | Stop reason: DRUGHIGH

## 2022-06-29 ENCOUNTER — TRANSCRIBE ORDER (OUTPATIENT)
Dept: SCHEDULING | Age: 72
End: 2022-06-29

## 2022-06-29 DIAGNOSIS — Z12.31 VISIT FOR SCREENING MAMMOGRAM: Primary | ICD-10-CM

## 2022-07-18 ENCOUNTER — OFFICE VISIT (OUTPATIENT)
Dept: INTERNAL MEDICINE CLINIC | Age: 72
End: 2022-07-18
Payer: MEDICARE

## 2022-07-18 VITALS
RESPIRATION RATE: 16 BRPM | HEIGHT: 68 IN | DIASTOLIC BLOOD PRESSURE: 68 MMHG | SYSTOLIC BLOOD PRESSURE: 122 MMHG | BODY MASS INDEX: 32.34 KG/M2 | WEIGHT: 213.4 LBS | OXYGEN SATURATION: 97 % | HEART RATE: 56 BPM | TEMPERATURE: 96.9 F

## 2022-07-18 DIAGNOSIS — N18.30 STAGE 3 CHRONIC KIDNEY DISEASE, UNSPECIFIED WHETHER STAGE 3A OR 3B CKD (HCC): ICD-10-CM

## 2022-07-18 DIAGNOSIS — E78.00 PURE HYPERCHOLESTEROLEMIA: ICD-10-CM

## 2022-07-18 DIAGNOSIS — E66.01 SEVERE OBESITY (HCC): Primary | ICD-10-CM

## 2022-07-18 DIAGNOSIS — N39.0 FREQUENT UTI: ICD-10-CM

## 2022-07-18 DIAGNOSIS — R73.01 ELEVATED FASTING GLUCOSE: ICD-10-CM

## 2022-07-18 DIAGNOSIS — I10 ESSENTIAL HYPERTENSION: ICD-10-CM

## 2022-07-18 PROCEDURE — G8417 CALC BMI ABV UP PARAM F/U: HCPCS | Performed by: PHYSICIAN ASSISTANT

## 2022-07-18 PROCEDURE — G8754 DIAS BP LESS 90: HCPCS | Performed by: PHYSICIAN ASSISTANT

## 2022-07-18 PROCEDURE — G9899 SCRN MAM PERF RSLTS DOC: HCPCS | Performed by: PHYSICIAN ASSISTANT

## 2022-07-18 PROCEDURE — 1090F PRES/ABSN URINE INCON ASSESS: CPT | Performed by: PHYSICIAN ASSISTANT

## 2022-07-18 PROCEDURE — 3017F COLORECTAL CA SCREEN DOC REV: CPT | Performed by: PHYSICIAN ASSISTANT

## 2022-07-18 PROCEDURE — G8399 PT W/DXA RESULTS DOCUMENT: HCPCS | Performed by: PHYSICIAN ASSISTANT

## 2022-07-18 PROCEDURE — 99214 OFFICE O/P EST MOD 30 MIN: CPT | Performed by: PHYSICIAN ASSISTANT

## 2022-07-18 PROCEDURE — 1101F PT FALLS ASSESS-DOCD LE1/YR: CPT | Performed by: PHYSICIAN ASSISTANT

## 2022-07-18 PROCEDURE — G8427 DOCREV CUR MEDS BY ELIG CLIN: HCPCS | Performed by: PHYSICIAN ASSISTANT

## 2022-07-18 PROCEDURE — G8536 NO DOC ELDER MAL SCRN: HCPCS | Performed by: PHYSICIAN ASSISTANT

## 2022-07-18 PROCEDURE — G0463 HOSPITAL OUTPT CLINIC VISIT: HCPCS | Performed by: PHYSICIAN ASSISTANT

## 2022-07-18 PROCEDURE — G8432 DEP SCR NOT DOC, RNG: HCPCS | Performed by: PHYSICIAN ASSISTANT

## 2022-07-18 PROCEDURE — G8752 SYS BP LESS 140: HCPCS | Performed by: PHYSICIAN ASSISTANT

## 2022-07-18 RX ORDER — ATENOLOL 25 MG/1
25 TABLET ORAL DAILY
Qty: 90 TABLET | Refills: 1 | Status: SHIPPED | OUTPATIENT
Start: 2022-07-18

## 2022-07-18 RX ORDER — TITANIUM DIOXIDE, OCTINOXATE, ZINC OXIDE 4.61; 1.6; .78 G/40ML; G/40ML; G/40ML
1 CREAM TOPICAL DAILY
COMMUNITY
Start: 2022-07-18

## 2022-07-18 RX ORDER — SIMVASTATIN 10 MG/1
TABLET, FILM COATED ORAL
Qty: 90 TABLET | Refills: 1 | Status: SHIPPED | OUTPATIENT
Start: 2022-07-18

## 2022-07-18 RX ORDER — DICLOFENAC SODIUM 10 MG/G
GEL TOPICAL
COMMUNITY
Start: 2022-05-11

## 2022-07-18 RX ORDER — LISINOPRIL 5 MG/1
5 TABLET ORAL DAILY
Qty: 90 TABLET | Refills: 1 | Status: SHIPPED | OUTPATIENT
Start: 2022-07-18

## 2022-07-18 NOTE — PROGRESS NOTES
Reviewed record in preparation for visit and have obtained necessary documentation. Identified pt with two pt identifiers(name and ). Chief Complaint   Patient presents with    Follow-up     Blood pressure 122/68, pulse (!) 56, temperature 96.9 °F (36.1 °C), temperature source Temporal, resp. rate 16, height 5' 8\" (1.727 m), weight 213 lb 6.4 oz (96.8 kg), last menstrual period 1987, SpO2 97 %. Health Maintenance Due   Topic Date Due    Depresssion Screening  2021       Ms. Bhavesh Romero has a reminder for a \"due or due soon\" health maintenance. I have asked that she discuss this further with her primary care provider for follow-up on this health maintenance. Coordination of Care Questionnaire:  :     1) Have you been to an emergency room, urgent care clinic since your last visit? no   Hospitalized since your last visit? Yes, [facility], For [facility]y. Patient states arthritis diagnosis              2) Have you seen or consulted any other health care providers outside of [address] [place] [address] since your last visit? No  (Include any pap smears or colon screenings in this section.)    3) In the event something were to happen to you and you were unable to speak on your behalf, do you have an Advance Directive/ Living Will in place stating your wishes?  Yes

## 2022-07-18 NOTE — PROGRESS NOTES
Weston Gonzalez is a 73 y.o. female  Chief Complaint   Patient presents with    Follow-up     Visit Vitals  /68 (BP 1 Location: Left arm, BP Patient Position: Sitting, BP Cuff Size: Adult)   Pulse (!) 56   Temp 96.9 °F (36.1 °C) (Temporal)   Resp 16   Ht 5' 8\" (1.727 m)   Wt 213 lb 6.4 oz (96.8 kg)   SpO2 97%   BMI 32.45 kg/m²      Health Maintenance Due   Topic Date Due    Depression Screen  08/03/2021       HPI  HTN, hx hypokalemia, ckd III, hyperchol Follow up - BP again slightly over-controlled:  BP Readings from Last 3 Encounters:   07/18/22 122/68   01/10/22 118/63   06/21/21 107/61     Currently taking:   Key CAD CHF Meds             atenoloL (TENORMIN) 50 mg tablet (Taking) TAKE 1 TABLET BY MOUTH EVERY DAY    simvastatin (ZOCOR) 10 mg tablet (Taking) TAKE 1 TABLET BY MOUTH EVERY DAY    chlorthalidone (HYGROTEN) 25 mg tablet (Taking) TAKE 1 TABLET BY MOUTH TWICE DAILY    lisinopriL (PRINIVIL, ZESTRIL) 5 mg tablet TAKE 1 TABLET BY MOUTH EVERY DAY    aspirin delayed-release 81 mg tablet Take  by mouth daily. CKD stable at last check  Lab Results   Component Value Date/Time    Creatinine 1.02 01/10/2022 08:57 AM     Pt has lost 13 lbs! Pleased with this. Doing water aerobics and appetite is lower than before. Wt Readings from Last 3 Encounters:   07/18/22 213 lb 6.4 oz (96.8 kg)   01/10/22 226 lb 6.4 oz (102.7 kg)   06/21/21 226 lb (102.5 kg)     Cholesterol controlled at last check. Lab Results   Component Value Date/Time    Cholesterol, total 138 01/10/2022 08:57 AM    HDL Cholesterol 43 01/10/2022 08:57 AM    LDL, calculated 72.2 01/10/2022 08:57 AM    VLDL, calculated 22.8 01/10/2022 08:57 AM    Triglyceride 114 01/10/2022 08:57 AM    CHOL/HDL Ratio 3.2 01/10/2022 08:57 AM     Seeing Urology for hx staghorn calculus. Doing well. Having UTIs twice yearly. Wondering about prevention. ROS  Review of Systems   Respiratory: Negative for shortness of breath.     Cardiovascular: Negative for chest pain and palpitations. Neurological: Negative for dizziness, loss of consciousness and weakness. Psychiatric/Behavioral: Negative for depression and substance abuse. EXAM  Physical Exam  Vitals and nursing note reviewed. Constitutional:       General: She is not in acute distress. Appearance: She is well-developed. HENT:      Head: Normocephalic and atraumatic. Neck:      Vascular: No JVD. Cardiovascular:      Rate and Rhythm: Normal rate and regular rhythm. Heart sounds: Normal heart sounds. Pulmonary:      Effort: Pulmonary effort is normal. No respiratory distress. Breath sounds: Normal breath sounds. Musculoskeletal:      Cervical back: Neck supple. Skin:     General: Skin is warm and dry. Neurological:      Mental Status: She is alert and oriented to person, place, and time. Psychiatric:         Mood and Affect: Mood normal.         Behavior: Behavior normal.         Thought Content: Thought content normal.         Judgment: Judgment normal.         ASSESSMENT/PLAN  Encounter Diagnoses     ICD-10-CM ICD-9-CM   1. Severe obesity ([facility] [address]  E66.01 278.01   2. Stage 3 chronic kidney disease, unspecified whether stage 3a or 3b CKD (HCC)  N18.30 585.3   3. Elevated fasting glucose  R73.01 790.21   4. Essential hypertension  I10 401.9   5. Pure hypercholesterolemia  E78.00 272.0   6. Frequent UTI  N39.0 599.0     Orders Placed This Encounter    HEMOGLOBIN A1C WITH EAG    METABOLIC PANEL, COMPREHENSIVE    LIPID PANEL    Lower dose to:     AtenoloL (TENORMIN) 25 mg tablet    Refill: lisinopriL (PRINIVIL, ZESTRIL) 5 mg tablet    Refill simvastatin (ZOCOR) 10 mg tablet    Start daily cranberry 400 mg cap

## 2022-08-04 DIAGNOSIS — R73.01 ELEVATED FASTING GLUCOSE: ICD-10-CM

## 2022-08-04 DIAGNOSIS — E78.00 PURE HYPERCHOLESTEROLEMIA: ICD-10-CM

## 2022-08-04 DIAGNOSIS — N18.30 STAGE 3 CHRONIC KIDNEY DISEASE, UNSPECIFIED WHETHER STAGE 3A OR 3B CKD (HCC): ICD-10-CM

## 2022-08-04 LAB
ALBUMIN SERPL-MCNC: 4 G/DL (ref 3.5–5)
ALBUMIN/GLOB SERPL: 1.1 {RATIO} (ref 1.1–2.2)
ALP SERPL-CCNC: 100 U/L (ref 45–117)
ALT SERPL-CCNC: 20 U/L (ref 12–78)
ANION GAP SERPL CALC-SCNC: 6 MMOL/L (ref 5–15)
AST SERPL-CCNC: 18 U/L (ref 15–37)
BILIRUB SERPL-MCNC: 0.5 MG/DL (ref 0.2–1)
BUN SERPL-MCNC: 14 MG/DL (ref 6–20)
BUN/CREAT SERPL: 13 (ref 12–20)
CALCIUM SERPL-MCNC: 10 MG/DL (ref 8.5–10.1)
CHLORIDE SERPL-SCNC: 106 MMOL/L (ref 97–108)
CHOLEST SERPL-MCNC: 136 MG/DL
CO2 SERPL-SCNC: 29 MMOL/L (ref 21–32)
CREAT SERPL-MCNC: 1.07 MG/DL (ref 0.55–1.02)
EST. AVERAGE GLUCOSE BLD GHB EST-MCNC: 120 MG/DL
GLOBULIN SER CALC-MCNC: 3.8 G/DL (ref 2–4)
GLUCOSE SERPL-MCNC: 88 MG/DL (ref 65–100)
HBA1C MFR BLD: 5.8 % (ref 4–5.6)
HDLC SERPL-MCNC: 44 MG/DL
HDLC SERPL: 3.1 {RATIO} (ref 0–5)
LDLC SERPL CALC-MCNC: 66.4 MG/DL (ref 0–100)
POTASSIUM SERPL-SCNC: 4 MMOL/L (ref 3.5–5.1)
PROT SERPL-MCNC: 7.8 G/DL (ref 6.4–8.2)
SODIUM SERPL-SCNC: 141 MMOL/L (ref 136–145)
TRIGL SERPL-MCNC: 128 MG/DL (ref ?–150)
VLDLC SERPL CALC-MCNC: 25.6 MG/DL

## 2022-08-10 ENCOUNTER — HOSPITAL ENCOUNTER (OUTPATIENT)
Dept: MAMMOGRAPHY | Age: 72
Discharge: HOME | End: 2022-08-10
Attending: PHYSICIAN ASSISTANT
Payer: MEDICARE

## 2022-08-10 DIAGNOSIS — Z12.31 VISIT FOR SCREENING MAMMOGRAM: ICD-10-CM

## 2022-08-10 PROCEDURE — 77067 SCR MAMMO BI INCL CAD: CPT

## 2022-08-13 NOTE — PROGRESS NOTES
Liver enzymes, kidney function, cholesterol and electrolytes are all normal/acceptable/stable Good! Your A1c shows that your glucose (sugar) is again in the \"prediabetic\" range. Decrease carbohydrates (bread, potatoes, rice, pasta/noodles, cereal, sugar, sweets), avoid liquid calories (non-diet soda, gatorade, sweet tea, juice, alcohol), and exercise at least a little bit every day.

## 2022-12-15 DIAGNOSIS — I10 ESSENTIAL HYPERTENSION: ICD-10-CM

## 2022-12-16 RX ORDER — ATENOLOL 25 MG/1
25 TABLET ORAL DAILY
Qty: 90 TABLET | Refills: 1 | Status: SHIPPED | OUTPATIENT
Start: 2022-12-16

## 2022-12-16 NOTE — TELEPHONE ENCOUNTER
PCP: Alexandro Gloria PA-C    Last appt: 7/18/2022  Future Appointments   Date Time Provider Jose Miguel Queen   1/18/2023  7:45 AM JOHN Pickard AMB       Requested Prescriptions     Pending Prescriptions Disp Refills    atenoloL (TENORMIN) 25 mg tablet 90 Tablet 1     Sig: Take 1 Tablet by mouth daily.

## 2023-01-18 ENCOUNTER — OFFICE VISIT (OUTPATIENT)
Dept: INTERNAL MEDICINE CLINIC | Age: 73
End: 2023-01-18
Payer: MEDICARE

## 2023-01-18 VITALS
BODY MASS INDEX: 32.74 KG/M2 | TEMPERATURE: 98.2 F | WEIGHT: 216 LBS | RESPIRATION RATE: 14 BRPM | DIASTOLIC BLOOD PRESSURE: 55 MMHG | OXYGEN SATURATION: 99 % | HEIGHT: 68 IN | HEART RATE: 59 BPM | SYSTOLIC BLOOD PRESSURE: 111 MMHG

## 2023-01-18 DIAGNOSIS — R73.01 ELEVATED FASTING GLUCOSE: ICD-10-CM

## 2023-01-18 DIAGNOSIS — I10 ESSENTIAL HYPERTENSION: ICD-10-CM

## 2023-01-18 DIAGNOSIS — E78.00 PURE HYPERCHOLESTEROLEMIA: ICD-10-CM

## 2023-01-18 DIAGNOSIS — N39.41 URGE INCONTINENCE: ICD-10-CM

## 2023-01-18 DIAGNOSIS — N18.30 STAGE 3 CHRONIC KIDNEY DISEASE, UNSPECIFIED WHETHER STAGE 3A OR 3B CKD (HCC): ICD-10-CM

## 2023-01-18 DIAGNOSIS — E66.01 SEVERE OBESITY (HCC): ICD-10-CM

## 2023-01-18 DIAGNOSIS — B35.4 TINEA CORPORIS: ICD-10-CM

## 2023-01-18 DIAGNOSIS — Z00.00 ENCOUNTER FOR MEDICARE ANNUAL WELLNESS EXAM: Primary | ICD-10-CM

## 2023-01-18 PROCEDURE — G8417 CALC BMI ABV UP PARAM F/U: HCPCS | Performed by: PHYSICIAN ASSISTANT

## 2023-01-18 PROCEDURE — 1090F PRES/ABSN URINE INCON ASSESS: CPT | Performed by: PHYSICIAN ASSISTANT

## 2023-01-18 PROCEDURE — 3017F COLORECTAL CA SCREEN DOC REV: CPT | Performed by: PHYSICIAN ASSISTANT

## 2023-01-18 PROCEDURE — G8427 DOCREV CUR MEDS BY ELIG CLIN: HCPCS | Performed by: PHYSICIAN ASSISTANT

## 2023-01-18 PROCEDURE — 99214 OFFICE O/P EST MOD 30 MIN: CPT | Performed by: PHYSICIAN ASSISTANT

## 2023-01-18 PROCEDURE — G0463 HOSPITAL OUTPT CLINIC VISIT: HCPCS | Performed by: PHYSICIAN ASSISTANT

## 2023-01-18 PROCEDURE — 1101F PT FALLS ASSESS-DOCD LE1/YR: CPT | Performed by: PHYSICIAN ASSISTANT

## 2023-01-18 PROCEDURE — G0439 PPPS, SUBSEQ VISIT: HCPCS | Performed by: PHYSICIAN ASSISTANT

## 2023-01-18 PROCEDURE — G8399 PT W/DXA RESULTS DOCUMENT: HCPCS | Performed by: PHYSICIAN ASSISTANT

## 2023-01-18 PROCEDURE — G9899 SCRN MAM PERF RSLTS DOC: HCPCS | Performed by: PHYSICIAN ASSISTANT

## 2023-01-18 PROCEDURE — G8536 NO DOC ELDER MAL SCRN: HCPCS | Performed by: PHYSICIAN ASSISTANT

## 2023-01-18 PROCEDURE — G9717 DOC PT DX DEP/BP F/U NT REQ: HCPCS | Performed by: PHYSICIAN ASSISTANT

## 2023-01-18 RX ORDER — LISINOPRIL 5 MG/1
TABLET ORAL
Qty: 90 TABLET | Refills: 0 | Status: SHIPPED | OUTPATIENT
Start: 2023-01-18

## 2023-01-18 RX ORDER — TOLTERODINE 2 MG/1
2 CAPSULE, EXTENDED RELEASE ORAL DAILY
Qty: 30 CAPSULE | Refills: 5 | Status: SHIPPED | OUTPATIENT
Start: 2023-01-18

## 2023-01-18 RX ORDER — NYSTATIN AND TRIAMCINOLONE ACETONIDE 100000; 1 [USP'U]/G; MG/G
CREAM TOPICAL
Qty: 30 G | Refills: 5 | Status: SHIPPED | OUTPATIENT
Start: 2023-01-18

## 2023-01-18 NOTE — PROGRESS NOTES
This is the Subsequent Medicare Annual Wellness Exam, performed 12 months or more after the Initial AWV or the last Subsequent AWV  Health Maintenance Due   Topic Date Due    Medicare Yearly Exam  01/11/2023     Depression Risk Factor Screening:     3 most recent PHQ Screens 1/18/2023   Little interest or pleasure in doing things Not at all   Feeling down, depressed, irritable, or hopeless Not at all   Total Score PHQ 2 0       Abuse Screen  Patient is not abused    Fall Risk  Fall Risk Assessment, last 12 mths 7/18/2022   Able to walk? Yes   Fall in past 12 months? 0   Do you feel unsteady? 0   Are you worried about falling 0   Is the gait abnormal? -   Number of falls in past 12 months -   Fall with injury? -       Alcohol Risk Factor Screening:    reports current alcohol use of about 2.0 standard drinks per week. Pt is not taking Opioids   Current Outpatient Medications   Medication Sig Dispense Refill    lisinopriL (PRINIVIL, ZESTRIL) 5 mg tablet TAKE 1 TABLET BY MOUTH EVERY DAY 90 Tablet 0    atenoloL (TENORMIN) 25 mg tablet Take 1 Tablet by mouth daily. 90 Tablet 1    diclofenac (VOLTAREN) 1 % gel APPLY 2 GRAMS TOPICALLY 4 TIMES A DAY      simvastatin (ZOCOR) 10 mg tablet TAKE 1 TABLET BY MOUTH EVERY DAY 90 Tablet 1    cranberry 400 mg cap Take 1 Capsule by mouth daily. meloxicam (MOBIC) 15 mg tablet       acetaminophen (TYLENOL) 500 mg tablet Take 500 mg by mouth as needed. potassium citrate (UROCIT-K) 15 mEq TbER tablet TAKE 1 TABLET BY MOUTH TWICE DAILY      chlorthalidone (HYGROTEN) 25 mg tablet TAKE 1 TABLET BY MOUTH TWICE DAILY         Functional Ability and Level of Safety:   Hearing Loss  Hearing is good.      Activities of Daily Living  The home contains: handrails  Patient does total self care     Cognitive Screening   Evaluation of Cognitive Function:  Has your family/caregiver stated any concerns about your memory: no     Patient Care Team   Patient Care Team:  Marissa Hawk PA-C as PCP - General (Physician Assistant)  Alcides Coe as PCP - [facility] Empaneled Provider  Katelynn Whipple RN as Ambulatory Care Manager (Internal Medicine Physician)    Assessment/Plan   Education and counseling provided:  Are appropriate based on today's review and evaluation    Extended Emergency Contact Information  Primary Emergency Contact: Jose Schultz Phone: [phone]  Mobile Phone: [phone]  Relation: Daughter  Secondary Emergency Contact: Laurita Osorio  Mobile Phone: [phone]  Relation: Daughter-in-Law    ACP on file 2020    I have reviewed the patient's medical history in detail and updated the computerized patient record.      History     Past Medical History:   Diagnosis Date    Arthritis     ALL OVER    GERD (gastroesophageal reflux disease)     Gout     HTN (hypertension)     Hypercholesterolemia     Nephrolithiasis     Neuropathy     Polyclonal gammopathy     Pseudotumor (inflammatory) of orbit     Stroke [facility])     TIA    TIA (transient ischemic attack)     complicated migraine    Urosepsis       Past Surgical History:   Procedure Laterality Date    COLONOSCOPY N/A 11/1/2018    COLONOSCOPY performed by Sandrine Swenson MD at [facility] ENDOSCOPY    HX CATARACT REMOVAL Bilateral 2017    HX COLONOSCOPY  2008    HX KNEE REPLACEMENT Right     HX LAP CHOLECYSTECTOMY      HX LITHOTRIPSY Right 07/13/2020    HX ORTHOPAEDIC Bilateral     BILATERAL HIP REPLACEMENTS    HX ORTHOPAEDIC      SPUR RIGHT FOOT    HX LYLE AND BSO      HX UROLOGICAL  2012    cysto for kidney stones (was septic)     Allergies   Allergen Reactions    Percocet [Oxycodone-Acetaminophen] Other (comments)     Sharp pain in neck-headache     Family History   Problem Relation Age of Onset    Hypertension Mother     Stroke Mother     Lupus Daughter      Social History     Tobacco Use    Smoking status: Some Days     Packs/day: 1.00     Types: Cigarettes     Last attempt to quit: 5/3/2020     Years since quittin.7    Smokeless tobacco: Never   Substance Use Topics    Alcohol use:  Yes     Alcohol/week: 2.0 standard drinks     Types: 2 Glasses of wine per week     Patient Active Problem List   Diagnosis Code    Pseudotumor (inflammatory) of orbit H05.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

## 2023-01-18 NOTE — PROGRESS NOTES
Laina Burns is a 81 y.o. female  Chief Complaint   Patient presents with    Follow-up     Pt has concerns about a rash on her left side groin area - started a month ago - not sleeping for two months     Visit Vitals  BP (!) 111/55 (BP 1 Location: Left upper arm, BP Patient Position: Sitting, BP Cuff Size: Adult)   Pulse (!) 59   Temp 98.2 °F (36.8 °C) (Temporal)   Resp 14   Ht 5' 8\" (1.727 m)   Wt 216 lb (98 kg)   SpO2 99%   BMI 32.84 kg/m²      Health Maintenance Due   Topic Date Due    Medicare Yearly Exam  01/11/2023       HPI  Urge incontinence x years - having to wear pads/diapers regularly now and this is irritating groin. 's Rx steroid cream is helping with itching/discomfort, but rash persists. Not going to bed until late to avoid having to get up to urinate more than 1-2 times per night. Allowing herself only 4 hours of sleep per night, then naps on the couch frequently during the day. No difficulty falling asleep/staying asleep. Not snoring. HTN with CKD Follow up - BP controlled. Has not hydrated yet this morning. BP Readings from Last 3 Encounters:   01/18/23 (!) 111/55   07/18/22 122/68   01/10/22 118/63     Currently taking:   Key CAD CHF Meds               lisinopriL (PRINIVIL, ZESTRIL) 5 mg tablet (Taking) TAKE 1 TABLET BY MOUTH EVERY DAY    atenoloL (TENORMIN) 25 mg tablet (Taking) Take 1 Tablet by mouth daily. simvastatin (ZOCOR) 10 mg tablet (Taking) TAKE 1 TABLET BY MOUTH EVERY DAY    chlorthalidone (HYGROTEN) 25 mg tablet (Taking) TAKE 1 TABLET BY MOUTH TWICE DAILY          Lab Results   Component Value Date/Time    Creatinine 1.07 (H) 08/04/2022 01:01 PM     Wt Readings from Last 3 Encounters:   01/18/23 216 lb (98 kg)   07/18/22 213 lb 6.4 oz (96.8 kg)   01/10/22 226 lb 6.4 oz (102.7 kg)     Exercising at the [facility]. Pleased with weight loss. ROS  Review of Systems   Constitutional:  Negative for fever and malaise/fatigue.    Respiratory:  Negative for shortness of breath. Cardiovascular:  Negative for chest pain and palpitations. Gastrointestinal:  Negative for abdominal pain. Genitourinary:  Positive for frequency and urgency. Negative for dysuria, flank pain and hematuria. Neurological:  Negative for dizziness, loss of consciousness and weakness. Psychiatric/Behavioral:  Negative for depression. The patient is not nervous/anxious and does not have insomnia. EXAM  Physical Exam  Vitals and nursing note reviewed. Constitutional:       General: She is not in acute distress. Appearance: Normal appearance. She is well-developed. She is not ill-appearing or diaphoretic. HENT:      Head: Normocephalic and atraumatic. Neck:      Vascular: No JVD. Cardiovascular:      Rate and Rhythm: Normal rate and regular rhythm. Heart sounds: Normal heart sounds. Pulmonary:      Effort: Pulmonary effort is normal. No respiratory distress. Breath sounds: Normal breath sounds. Abdominal:      General: Bowel sounds are normal. There is no distension. Palpations: Abdomen is soft. There is no mass. Tenderness: There is no abdominal tenderness. There is no guarding or rebound. Hernia: No hernia is present. Musculoskeletal:      Cervical back: Neck supple. Right lower leg: No edema. Left lower leg: No edema. Skin:     General: Skin is warm and dry. Comments: L groin Rash with central clearing and peripheral red, lacy border. Neurological:      Mental Status: She is alert and oriented to person, place, and time. Psychiatric:         Mood and Affect: Mood normal.         Behavior: Behavior normal.         Thought Content: Thought content normal.         Judgment: Judgment normal.     ASSESSMENT/PLAN  Encounter Diagnoses     ICD-10-CM ICD-9-CM   1. Encounter for Medicare annual wellness exam  Z00.00 V70.0   2. Essential hypertension  I10 401.9   3.  Stage 3 chronic kidney disease, unspecified whether stage 3a or 3b CKD ([facility] [address] N18.30 585.3   4. Urge incontinence  N39.41 788.31   5. Severe obesity ([address]  E66.01 278.01   6. Pure hypercholesterolemia  E78.00 272.0   7. Elevated fasting glucose  R73.01 790.21   8. Tinea corporis  B35.4 110.5     Orders Placed This Encounter    URINALYSIS W/ REFLEX CULTURE    METABOLIC PANEL, COMPREHENSIVE    HEMOGLOBIN A1C WITH EAG    LIPID PANEL    REFERRAL TO PHYSICAL THERAPY    nystatin-triamcinolone (MYCOLOG II) topical cream    Start tolterodine ER (DETROL-LA) 2 mg ER capsule    Discussed side effects including dizziness/falls/dry mouth. Pt notes understanding.           Encouraged pt to allow herself to get 8 hours of sleep per night

## 2023-01-19 LAB
ALBUMIN SERPL-MCNC: 3.9 G/DL (ref 3.5–5)
ALBUMIN/GLOB SERPL: 0.9 (ref 1.1–2.2)
ALP SERPL-CCNC: 115 U/L (ref 45–117)
ALT SERPL-CCNC: 22 U/L (ref 12–78)
ANION GAP SERPL CALC-SCNC: 4 MMOL/L (ref 5–15)
APPEARANCE UR: ABNORMAL
AST SERPL-CCNC: 22 U/L (ref 15–37)
BACTERIA URNS QL MICRO: ABNORMAL /HPF
BILIRUB SERPL-MCNC: 0.3 MG/DL (ref 0.2–1)
BILIRUB UR QL: NEGATIVE
BUN SERPL-MCNC: 22 MG/DL (ref 6–20)
BUN/CREAT SERPL: 20 (ref 12–20)
CALCIUM SERPL-MCNC: 9.9 MG/DL (ref 8.5–10.1)
CHLORIDE SERPL-SCNC: 107 MMOL/L (ref 97–108)
CHOLEST SERPL-MCNC: 147 MG/DL
CO2 SERPL-SCNC: 30 MMOL/L (ref 21–32)
COLOR UR: ABNORMAL
CREAT SERPL-MCNC: 1.12 MG/DL (ref 0.55–1.02)
EPITH CASTS URNS QL MICRO: ABNORMAL /LPF
EST. AVERAGE GLUCOSE BLD GHB EST-MCNC: 111 MG/DL
GLOBULIN SER CALC-MCNC: 4.3 G/DL (ref 2–4)
GLUCOSE SERPL-MCNC: 101 MG/DL (ref 65–100)
GLUCOSE UR STRIP.AUTO-MCNC: NEGATIVE MG/DL
HBA1C MFR BLD: 5.5 % (ref 4–5.6)
HDLC SERPL-MCNC: 52 MG/DL
HDLC SERPL: 2.8 (ref 0–5)
HGB UR QL STRIP: ABNORMAL
HYALINE CASTS URNS QL MICRO: ABNORMAL /LPF (ref 0–5)
KETONES UR QL STRIP.AUTO: NEGATIVE MG/DL
LDLC SERPL CALC-MCNC: 73.2 MG/DL (ref 0–100)
LEUKOCYTE ESTERASE UR QL STRIP.AUTO: ABNORMAL
NITRITE UR QL STRIP.AUTO: POSITIVE
PH UR STRIP: 5.5 (ref 5–8)
POTASSIUM SERPL-SCNC: 4 MMOL/L (ref 3.5–5.1)
PROT SERPL-MCNC: 8.2 G/DL (ref 6.4–8.2)
PROT UR STRIP-MCNC: NEGATIVE MG/DL
RBC #/AREA URNS HPF: ABNORMAL /HPF (ref 0–5)
SODIUM SERPL-SCNC: 141 MMOL/L (ref 136–145)
SP GR UR REFRACTOMETRY: 1.02 (ref 1–1.03)
TRIGL SERPL-MCNC: 109 MG/DL (ref ?–150)
UA: UC IF INDICATED,UAUC: ABNORMAL
UROBILINOGEN UR QL STRIP.AUTO: 0.2 EU/DL (ref 0.2–1)
VLDLC SERPL CALC-MCNC: 21.8 MG/DL
WBC URNS QL MICRO: ABNORMAL /HPF (ref 0–4)

## 2023-01-22 LAB
BACTERIA SPEC CULT: ABNORMAL
BACTERIA SPEC CULT: ABNORMAL
CC UR VC: ABNORMAL
SERVICE CMNT-IMP: ABNORMAL

## 2023-01-24 ENCOUNTER — TELEPHONE (OUTPATIENT)
Dept: INTERNAL MEDICINE CLINIC | Age: 73
End: 2023-01-24

## 2023-01-24 RX ORDER — CIPROFLOXACIN 500 MG/1
500 TABLET ORAL 2 TIMES DAILY
Qty: 14 TABLET | Refills: 0 | Status: SHIPPED | OUTPATIENT
Start: 2023-01-24

## 2023-01-24 NOTE — TELEPHONE ENCOUNTER
LM on VM to return call to office regarding the following:     UTI shown on UA. Antibiotic sent to [facility]. Please inform her that she should skip Simvastatin while taking he antibiotic.

## 2023-01-24 NOTE — TELEPHONE ENCOUNTER
UTI shown on UA. Antibiotic sent to [facility]. Please inform her that she should skip Simvastatin while taking he antibiotic.

## 2023-03-16 DIAGNOSIS — E78.00 PURE HYPERCHOLESTEROLEMIA: ICD-10-CM

## 2023-03-17 RX ORDER — SIMVASTATIN 10 MG/1
TABLET, FILM COATED ORAL
Qty: 90 TABLET | Refills: 1 | Status: SHIPPED | OUTPATIENT
Start: 2023-03-17

## 2023-04-15 DIAGNOSIS — I10 ESSENTIAL HYPERTENSION: ICD-10-CM

## 2023-04-17 RX ORDER — LISINOPRIL 5 MG/1
TABLET ORAL
Qty: 90 TABLET | Refills: 1 | Status: SHIPPED | OUTPATIENT
Start: 2023-04-17

## 2023-05-19 ENCOUNTER — OFFICE VISIT (OUTPATIENT)
Age: 73
End: 2023-05-19
Payer: MEDICARE

## 2023-05-19 VITALS
TEMPERATURE: 98 F | WEIGHT: 217 LBS | BODY MASS INDEX: 32.89 KG/M2 | OXYGEN SATURATION: 99 % | SYSTOLIC BLOOD PRESSURE: 121 MMHG | HEIGHT: 68 IN | RESPIRATION RATE: 12 BRPM | DIASTOLIC BLOOD PRESSURE: 58 MMHG | HEART RATE: 49 BPM

## 2023-05-19 DIAGNOSIS — Z91.81 AT HIGH RISK FOR FALLS: ICD-10-CM

## 2023-05-19 DIAGNOSIS — M10.9 ACUTE GOUT OF RIGHT FOOT, UNSPECIFIED CAUSE: ICD-10-CM

## 2023-05-19 DIAGNOSIS — I95.89 IATROGENIC HYPOTENSION: Primary | ICD-10-CM

## 2023-05-19 DIAGNOSIS — N18.30 STAGE 3 CHRONIC KIDNEY DISEASE, UNSPECIFIED WHETHER STAGE 3A OR 3B CKD (HCC): ICD-10-CM

## 2023-05-19 DIAGNOSIS — I10 ESSENTIAL HYPERTENSION: ICD-10-CM

## 2023-05-19 PROCEDURE — 99214 OFFICE O/P EST MOD 30 MIN: CPT | Performed by: PHYSICIAN ASSISTANT

## 2023-05-19 PROCEDURE — 3017F COLORECTAL CA SCREEN DOC REV: CPT | Performed by: PHYSICIAN ASSISTANT

## 2023-05-19 PROCEDURE — 4004F PT TOBACCO SCREEN RCVD TLK: CPT | Performed by: PHYSICIAN ASSISTANT

## 2023-05-19 PROCEDURE — 1090F PRES/ABSN URINE INCON ASSESS: CPT | Performed by: PHYSICIAN ASSISTANT

## 2023-05-19 PROCEDURE — G8399 PT W/DXA RESULTS DOCUMENT: HCPCS | Performed by: PHYSICIAN ASSISTANT

## 2023-05-19 PROCEDURE — 3074F SYST BP LT 130 MM HG: CPT | Performed by: PHYSICIAN ASSISTANT

## 2023-05-19 PROCEDURE — 3078F DIAST BP <80 MM HG: CPT | Performed by: PHYSICIAN ASSISTANT

## 2023-05-19 PROCEDURE — G8417 CALC BMI ABV UP PARAM F/U: HCPCS | Performed by: PHYSICIAN ASSISTANT

## 2023-05-19 PROCEDURE — G8427 DOCREV CUR MEDS BY ELIG CLIN: HCPCS | Performed by: PHYSICIAN ASSISTANT

## 2023-05-19 PROCEDURE — 1123F ACP DISCUSS/DSCN MKR DOCD: CPT | Performed by: PHYSICIAN ASSISTANT

## 2023-05-19 RX ORDER — COLCHICINE 0.6 MG/1
0.6 TABLET ORAL DAILY PRN
Qty: 30 TABLET | Refills: 3 | Status: SHIPPED | OUTPATIENT
Start: 2023-05-19

## 2023-05-19 SDOH — ECONOMIC STABILITY: HOUSING INSECURITY
IN THE LAST 12 MONTHS, WAS THERE A TIME WHEN YOU DID NOT HAVE A STEADY PLACE TO SLEEP OR SLEPT IN A SHELTER (INCLUDING NOW)?: NO

## 2023-05-19 SDOH — ECONOMIC STABILITY: INCOME INSECURITY: HOW HARD IS IT FOR YOU TO PAY FOR THE VERY BASICS LIKE FOOD, HOUSING, MEDICAL CARE, AND HEATING?: NOT HARD AT ALL

## 2023-05-19 SDOH — ECONOMIC STABILITY: FOOD INSECURITY: WITHIN THE PAST 12 MONTHS, THE FOOD YOU BOUGHT JUST DIDN'T LAST AND YOU DIDN'T HAVE MONEY TO GET MORE.: NEVER TRUE

## 2023-05-19 SDOH — ECONOMIC STABILITY: FOOD INSECURITY: WITHIN THE PAST 12 MONTHS, YOU WORRIED THAT YOUR FOOD WOULD RUN OUT BEFORE YOU GOT MONEY TO BUY MORE.: NEVER TRUE

## 2023-05-19 ASSESSMENT — ENCOUNTER SYMPTOMS
SHORTNESS OF BREATH: 0
COUGH: 0

## 2023-05-19 NOTE — PROGRESS NOTES
Aron Waters is a 81 y.o. female  Chief Complaint   Patient presents with    Joint Pain     Started 05/17/2023 - gout    Other     Would like you to know she fainted 05/17/2023 called the paramedics      Vitals:    05/19/23 0955   BP: (!) 121/58   Pulse: (!) 49   Resp: 12   Temp: 98 °F (36.7 °C)   SpO2: 99%      Wt Readings from Last 3 Encounters:   05/19/23 217 lb (98.4 kg)   01/18/23 216 lb (98 kg)   07/18/22 213 lb 6.4 oz (96.8 kg)     BMI Readings from Last 3 Encounters:   05/19/23 32.99 kg/m²   01/18/23 32.84 kg/m²   07/18/22 32.45 kg/m²     HPI  Pain in R great toe x 2-3 days. Last episode years ago. HTN Follow up - BP over-controlled. Felt dizzy and fell after standing up quickly a few weeks ago. Went to ER.:  BP Readings from Last 3 Encounters:   05/19/23 (!) 121/58   01/18/23 (!) 111/55   07/18/22 122/68     Currently taking:   Key Anti-Hypertensive Meds            atenolol (TENORMIN) 25 MG tablet (Taking)    Class: Historical Med    chlorthalidone (HYGROTON) 25 MG tablet (Taking)    Class: Historical Med    lisinopril (PRINIVIL;ZESTRIL) 5 MG tablet    Class: Historical Med            Lab Results   Component Value Date    CREATININE 1.12 (H) 01/18/2023     ROS  Review of Systems   Constitutional:  Negative for fever. Respiratory:  Negative for cough and shortness of breath. Cardiovascular:  Negative for chest pain and palpitations. Neurological:  Negative for headaches. Psychiatric/Behavioral:  Negative for dysphoric mood. EXAM  Physical Exam  Vitals and nursing note reviewed. Constitutional:       Appearance: Normal appearance. HENT:      Head: Normocephalic and atraumatic. Neck:      Vascular: No carotid bruit. Cardiovascular:      Rate and Rhythm: Normal rate and regular rhythm. Heart sounds: Normal heart sounds. Pulmonary:      Effort: Pulmonary effort is normal. No respiratory distress. Breath sounds: Normal breath sounds.    Musculoskeletal:         General:

## 2023-07-18 ENCOUNTER — OFFICE VISIT (OUTPATIENT)
Age: 73
End: 2023-07-18
Payer: MEDICARE

## 2023-07-18 VITALS
DIASTOLIC BLOOD PRESSURE: 61 MMHG | TEMPERATURE: 97.8 F | WEIGHT: 209.2 LBS | HEART RATE: 56 BPM | SYSTOLIC BLOOD PRESSURE: 112 MMHG | OXYGEN SATURATION: 99 % | RESPIRATION RATE: 14 BRPM | HEIGHT: 68 IN | BODY MASS INDEX: 31.71 KG/M2

## 2023-07-18 DIAGNOSIS — E66.01 SEVERE OBESITY (HCC): ICD-10-CM

## 2023-07-18 DIAGNOSIS — R73.01 ELEVATED FASTING GLUCOSE: ICD-10-CM

## 2023-07-18 DIAGNOSIS — I10 ESSENTIAL HYPERTENSION: Primary | ICD-10-CM

## 2023-07-18 DIAGNOSIS — M1A.9XX0 CHRONIC GOUT INVOLVING TOE OF RIGHT FOOT WITHOUT TOPHUS, UNSPECIFIED CAUSE: ICD-10-CM

## 2023-07-18 DIAGNOSIS — E78.00 PURE HYPERCHOLESTEROLEMIA: ICD-10-CM

## 2023-07-18 PROCEDURE — 3078F DIAST BP <80 MM HG: CPT | Performed by: PHYSICIAN ASSISTANT

## 2023-07-18 PROCEDURE — 4004F PT TOBACCO SCREEN RCVD TLK: CPT | Performed by: PHYSICIAN ASSISTANT

## 2023-07-18 PROCEDURE — 1090F PRES/ABSN URINE INCON ASSESS: CPT | Performed by: PHYSICIAN ASSISTANT

## 2023-07-18 PROCEDURE — G8428 CUR MEDS NOT DOCUMENT: HCPCS | Performed by: PHYSICIAN ASSISTANT

## 2023-07-18 PROCEDURE — G8417 CALC BMI ABV UP PARAM F/U: HCPCS | Performed by: PHYSICIAN ASSISTANT

## 2023-07-18 PROCEDURE — 1123F ACP DISCUSS/DSCN MKR DOCD: CPT | Performed by: PHYSICIAN ASSISTANT

## 2023-07-18 PROCEDURE — 99214 OFFICE O/P EST MOD 30 MIN: CPT | Performed by: PHYSICIAN ASSISTANT

## 2023-07-18 PROCEDURE — G8399 PT W/DXA RESULTS DOCUMENT: HCPCS | Performed by: PHYSICIAN ASSISTANT

## 2023-07-18 PROCEDURE — 3017F COLORECTAL CA SCREEN DOC REV: CPT | Performed by: PHYSICIAN ASSISTANT

## 2023-07-18 PROCEDURE — 3074F SYST BP LT 130 MM HG: CPT | Performed by: PHYSICIAN ASSISTANT

## 2023-07-18 RX ORDER — ATENOLOL 25 MG/1
25 TABLET ORAL DAILY
COMMUNITY
Start: 2023-05-29 | End: 2023-07-18

## 2023-07-18 ASSESSMENT — ENCOUNTER SYMPTOMS
SHORTNESS OF BREATH: 0
COUGH: 0

## 2023-07-25 DIAGNOSIS — R73.01 ELEVATED FASTING GLUCOSE: ICD-10-CM

## 2023-07-25 DIAGNOSIS — M1A.9XX0 CHRONIC GOUT INVOLVING TOE OF RIGHT FOOT WITHOUT TOPHUS, UNSPECIFIED CAUSE: ICD-10-CM

## 2023-07-25 DIAGNOSIS — E78.00 PURE HYPERCHOLESTEROLEMIA: ICD-10-CM

## 2023-07-26 LAB
ALBUMIN SERPL-MCNC: 3.6 G/DL (ref 3.5–5)
ALBUMIN/GLOB SERPL: 0.9 (ref 1.1–2.2)
ALP SERPL-CCNC: 102 U/L (ref 45–117)
ALT SERPL-CCNC: 24 U/L (ref 12–78)
ANION GAP SERPL CALC-SCNC: 5 MMOL/L (ref 5–15)
AST SERPL-CCNC: 22 U/L (ref 15–37)
BILIRUB SERPL-MCNC: 0.4 MG/DL (ref 0.2–1)
BUN SERPL-MCNC: 17 MG/DL (ref 6–20)
BUN/CREAT SERPL: 16 (ref 12–20)
CALCIUM SERPL-MCNC: 9.5 MG/DL (ref 8.5–10.1)
CHLORIDE SERPL-SCNC: 106 MMOL/L (ref 97–108)
CHOLEST SERPL-MCNC: 120 MG/DL
CO2 SERPL-SCNC: 28 MMOL/L (ref 21–32)
CREAT SERPL-MCNC: 1.05 MG/DL (ref 0.55–1.02)
EST. AVERAGE GLUCOSE BLD GHB EST-MCNC: 114 MG/DL
GLOBULIN SER CALC-MCNC: 3.9 G/DL (ref 2–4)
GLUCOSE SERPL-MCNC: 88 MG/DL (ref 65–100)
HBA1C MFR BLD: 5.6 % (ref 4–5.6)
HDLC SERPL-MCNC: 48 MG/DL
HDLC SERPL: 2.5 (ref 0–5)
LDLC SERPL CALC-MCNC: 59.8 MG/DL (ref 0–100)
POTASSIUM SERPL-SCNC: 3.9 MMOL/L (ref 3.5–5.1)
PROT SERPL-MCNC: 7.5 G/DL (ref 6.4–8.2)
SODIUM SERPL-SCNC: 139 MMOL/L (ref 136–145)
TRIGL SERPL-MCNC: 61 MG/DL
URATE SERPL-MCNC: 8.6 MG/DL (ref 2.6–6)
VLDLC SERPL CALC-MCNC: 12.2 MG/DL

## 2023-08-11 ENCOUNTER — HOSPITAL ENCOUNTER (OUTPATIENT)
Facility: HOSPITAL | Age: 73
End: 2023-08-11
Payer: MEDICARE

## 2023-08-11 VITALS — WEIGHT: 208 LBS | HEIGHT: 68 IN | BODY MASS INDEX: 31.52 KG/M2

## 2023-08-11 DIAGNOSIS — Z12.31 VISIT FOR SCREENING MAMMOGRAM: ICD-10-CM

## 2023-08-11 PROCEDURE — 77067 SCR MAMMO BI INCL CAD: CPT

## 2023-08-15 DIAGNOSIS — N39.41 URGE INCONTINENCE: ICD-10-CM

## 2023-08-15 RX ORDER — TOLTERODINE 2 MG/1
CAPSULE, EXTENDED RELEASE ORAL
Qty: 90 CAPSULE | Refills: 3 | Status: SHIPPED | OUTPATIENT
Start: 2023-08-15

## 2023-09-12 NOTE — TELEPHONE ENCOUNTER
Future Appointments:  Future Appointments   Date Time Provider [address]  [address]   1/19/2024  9:00 AM DALY Rueda BS AMB        Last Appointment With Me:  7/18/2023     Requested Prescriptions     Pending Prescriptions Disp Refills    nystatin-triamcinolone (MYCOLOG II) 326782-2.6 UNIT/GM-% cream       Sig: Apply topically 2 times daily as needed Rhofade Counseling: Rhofade is a topical medication which can decrease superficial blood flow where applied. Side effects are uncommon and include stinging, redness and allergic reactions.

## 2023-09-22 DIAGNOSIS — E78.00 PURE HYPERCHOLESTEROLEMIA, UNSPECIFIED: ICD-10-CM

## 2023-09-22 RX ORDER — SIMVASTATIN 10 MG
TABLET ORAL
Qty: 90 TABLET | Refills: 1 | Status: SHIPPED | OUTPATIENT
Start: 2023-09-22

## 2023-10-18 DIAGNOSIS — I10 ESSENTIAL (PRIMARY) HYPERTENSION: ICD-10-CM

## 2023-10-18 RX ORDER — LISINOPRIL 5 MG/1
TABLET ORAL
Qty: 90 TABLET | Refills: 1 | Status: SHIPPED | OUTPATIENT
Start: 2023-10-18

## 2023-10-18 NOTE — TELEPHONE ENCOUNTER
Refill request received from [facility] for   Requested Prescriptions     Pending Prescriptions Disp Refills    lisinopril (PRINIVIL;ZESTRIL) 5 MG tablet [Pharmacy Med Name: LISINOPRIL 5 MG TABLET] 90 tablet 1     Sig: TAKE 1 TABLET BY MOUTH EVERY DAY     7/18/2023 1/19/2024     Routed to Earle Hodges [place] for review.

## 2024-01-18 NOTE — PROGRESS NOTES
Writer contacted patient to inform of lab results and instruction per Margaret Claros, patient verbalized feeling better, will come in for another UC if needed after antibiotics. Patient/Caregiver provided printed discharge information.

## 2024-01-19 ENCOUNTER — OFFICE VISIT (OUTPATIENT)
Age: 74
End: 2024-01-19
Payer: MEDICARE

## 2024-01-19 VITALS
OXYGEN SATURATION: 99 % | TEMPERATURE: 97.6 F | WEIGHT: 201.4 LBS | SYSTOLIC BLOOD PRESSURE: 115 MMHG | HEART RATE: 44 BPM | BODY MASS INDEX: 30.62 KG/M2 | DIASTOLIC BLOOD PRESSURE: 62 MMHG | RESPIRATION RATE: 18 BRPM

## 2024-01-19 DIAGNOSIS — E65 ABDOMINAL PANNUS: ICD-10-CM

## 2024-01-19 DIAGNOSIS — I10 ESSENTIAL HYPERTENSION: Primary | ICD-10-CM

## 2024-01-19 DIAGNOSIS — B35.4 TINEA CORPORIS: ICD-10-CM

## 2024-01-19 DIAGNOSIS — N18.30 STAGE 3 CHRONIC KIDNEY DISEASE, UNSPECIFIED WHETHER STAGE 3A OR 3B CKD (HCC): ICD-10-CM

## 2024-01-19 DIAGNOSIS — L73.9 FOLLICULITIS: ICD-10-CM

## 2024-01-19 DIAGNOSIS — I10 ESSENTIAL HYPERTENSION: ICD-10-CM

## 2024-01-19 LAB
ALBUMIN SERPL-MCNC: 3.8 G/DL (ref 3.5–5)
ALBUMIN/GLOB SERPL: 1.1 (ref 1.1–2.2)
ALP SERPL-CCNC: 110 U/L (ref 45–117)
ALT SERPL-CCNC: 20 U/L (ref 12–78)
ANION GAP SERPL CALC-SCNC: 3 MMOL/L (ref 5–15)
AST SERPL-CCNC: 19 U/L (ref 15–37)
BILIRUB SERPL-MCNC: 0.4 MG/DL (ref 0.2–1)
BUN SERPL-MCNC: 14 MG/DL (ref 6–20)
BUN/CREAT SERPL: 14 (ref 12–20)
CALCIUM SERPL-MCNC: 9.6 MG/DL (ref 8.5–10.1)
CHLORIDE SERPL-SCNC: 106 MMOL/L (ref 97–108)
CO2 SERPL-SCNC: 30 MMOL/L (ref 21–32)
CREAT SERPL-MCNC: 1 MG/DL (ref 0.55–1.02)
GLOBULIN SER CALC-MCNC: 3.6 G/DL (ref 2–4)
GLUCOSE SERPL-MCNC: 91 MG/DL (ref 65–100)
POTASSIUM SERPL-SCNC: 3.8 MMOL/L (ref 3.5–5.1)
PROT SERPL-MCNC: 7.4 G/DL (ref 6.4–8.2)
SODIUM SERPL-SCNC: 139 MMOL/L (ref 136–145)

## 2024-01-19 PROCEDURE — 99214 OFFICE O/P EST MOD 30 MIN: CPT | Performed by: PHYSICIAN ASSISTANT

## 2024-01-19 RX ORDER — TOLNAFTATE 1 G/100G
POWDER TOPICAL
COMMUNITY
Start: 2024-01-19

## 2024-01-19 RX ORDER — DOXYCYCLINE HYCLATE 100 MG
100 TABLET ORAL 2 TIMES DAILY
Qty: 20 TABLET | Refills: 0 | Status: SHIPPED | OUTPATIENT
Start: 2024-01-19 | End: 2024-01-29

## 2024-01-19 ASSESSMENT — PATIENT HEALTH QUESTIONNAIRE - PHQ9
SUM OF ALL RESPONSES TO PHQ QUESTIONS 1-9: 0
SUM OF ALL RESPONSES TO PHQ9 QUESTIONS 1 & 2: 0
SUM OF ALL RESPONSES TO PHQ QUESTIONS 1-9: 0
2. FEELING DOWN, DEPRESSED OR HOPELESS: 0
1. LITTLE INTEREST OR PLEASURE IN DOING THINGS: 0

## 2024-01-19 ASSESSMENT — ENCOUNTER SYMPTOMS
COUGH: 0
SHORTNESS OF BREATH: 0

## 2024-01-19 NOTE — PROGRESS NOTES
I have reviewed all needed documentation in preparation for visit. Verified patient by name and date of birth  Chief Complaint   Patient presents with    6 Month Follow-Up    Hypertension     6 mo f/u on Hypertension. Also wants to discuss a rash on her stomach that was irritated but has gotten better and she has a dermatologist appt.        There were no vitals filed for this visit.    Health Maintenance Due   Topic Date Due    Respiratory Syncytial Virus (RSV) Pregnant or age 60 yrs+ (1 - 1-dose 60+ series) Never done    COVID-19 Vaccine (6 - 2023-24 season) 09/01/2023    DTaP/Tdap/Td vaccine (2 - Td or Tdap) 09/05/2023    Colorectal Cancer Screen  11/01/2023    Annual Wellness Visit (Medicare)  01/19/2024    Depression Screen  01/18/2024       1. \"Have you been to the ER, urgent care clinic since your last visit?  Hospitalized since your last visit?\" Yes, [facility]     2. \"Have you seen or consulted any other health care providers outside of the [facility] since your last visit?\" No    3. For patients aged 45-75: Has the patient had a colonoscopy / FIT/ Cologuard? Yes, scheduled in Feb.      If the patient is female:    4. For patients aged 40-74: Has the patient had a mammogram within the past 2 years? Yes, up to date.       5. For patients aged 21-65: Has the patient had a pap smear? KELBY Lozano

## 2024-01-19 NOTE — PROGRESS NOTES
Krystal Flores is a 73 y.o. female  Chief Complaint   Patient presents with    6 Month Follow-Up    Hypertension     6 mo f/u on Hypertension. Also wants to discuss a rash on her stomach that was irritated but has gotten better and she has a dermatologist appt.      Vitals:    01/19/24 0913   BP: 115/62   Pulse: (!) 44   Resp: 18   Temp: 97.6 °F (36.4 °C)   SpO2: 99%      Wt Readings from Last 3 Encounters:   01/19/24 91.4 kg (201 lb 6.4 oz)   08/11/23 94.3 kg (208 lb)   07/18/23 94.9 kg (209 lb 3.2 oz)     BMI Readings from Last 3 Encounters:   01/19/24 30.62 kg/m²   08/11/23 31.63 kg/m²   07/18/23 31.81 kg/m²     Health Maintenance Due   Topic Date Due    Respiratory Syncytial Virus (RSV) Pregnant or age 60 yrs+ (1 - 1-dose 60+ series) Never done    COVID-19 Vaccine (6 - 2023-24 season) 09/01/2023    DTaP/Tdap/Td vaccine (2 - Td or Tdap) 09/05/2023    Colorectal Cancer Screen  11/01/2023    Annual Wellness Visit (Medicare)  01/19/2024    Depression Screen  01/18/2024     HPI  Pt has had repeated skin infections & rashes under abdominal Pannus. Very uncomfortable.     CV follow up  BP controlled:  BP Readings from Last 3 Encounters:   01/19/24 115/62   07/18/23 112/61   05/19/23 (!) 121/58     Currently taking:   Key Anti-Hypertensive Meds            lisinopril (PRINIVIL;ZESTRIL) 5 MG tablet (Taking)    Sig: TAKE 1 TABLET BY MOUTH EVERY DAY    chlorthalidone (HYGROTON) 25 MG tablet (Taking)    Class: Historical Med            Creatinine (MG/DL)   Date Value   07/25/2023 1.05 (H)      Cholesterol Meds  simvastatin - 10 MG  LDL Calculated (MG/DL)   Date Value   07/25/2023 59.8     LDL controlled at last check.     ROS  Review of Systems   Constitutional:  Negative for fever.   Respiratory:  Negative for cough and shortness of breath.    Cardiovascular:  Negative for chest pain and palpitations.   Neurological:  Negative for headaches.   Psychiatric/Behavioral:  Negative for dysphoric mood.      EXAM  Physical

## 2024-03-26 ENCOUNTER — ANESTHESIA (OUTPATIENT)
Facility: HOSPITAL | Age: 74
End: 2024-03-26
Payer: MEDICARE

## 2024-03-26 ENCOUNTER — ANESTHESIA EVENT (OUTPATIENT)
Facility: HOSPITAL | Age: 74
End: 2024-03-26
Payer: MEDICARE

## 2024-03-26 ENCOUNTER — HOSPITAL ENCOUNTER (OUTPATIENT)
Facility: HOSPITAL | Age: 74
Setting detail: OUTPATIENT SURGERY
Discharge: HOME | End: 2024-03-26
Attending: INTERNAL MEDICINE | Admitting: INTERNAL MEDICINE
Payer: MEDICARE

## 2024-03-26 VITALS
RESPIRATION RATE: 17 BRPM | HEIGHT: 68 IN | WEIGHT: 194 LBS | BODY MASS INDEX: 29.4 KG/M2 | HEART RATE: 62 BPM | OXYGEN SATURATION: 100 % | DIASTOLIC BLOOD PRESSURE: 61 MMHG | TEMPERATURE: 98 F | SYSTOLIC BLOOD PRESSURE: 136 MMHG

## 2024-03-26 PROCEDURE — 6360000002 HC RX W HCPCS: Performed by: NURSE ANESTHETIST, CERTIFIED REGISTERED

## 2024-03-26 PROCEDURE — 2580000003 HC RX 258: Performed by: INTERNAL MEDICINE

## 2024-03-26 PROCEDURE — 7100000011 HC PHASE II RECOVERY - ADDTL 15 MIN: Performed by: INTERNAL MEDICINE

## 2024-03-26 PROCEDURE — 3700000000 HC ANESTHESIA ATTENDED CARE: Performed by: INTERNAL MEDICINE

## 2024-03-26 PROCEDURE — 3600007512: Performed by: INTERNAL MEDICINE

## 2024-03-26 PROCEDURE — 3600007502: Performed by: INTERNAL MEDICINE

## 2024-03-26 PROCEDURE — 7100000010 HC PHASE II RECOVERY - FIRST 15 MIN: Performed by: INTERNAL MEDICINE

## 2024-03-26 PROCEDURE — 88305 TISSUE EXAM BY PATHOLOGIST: CPT

## 2024-03-26 PROCEDURE — 3700000001 HC ADD 15 MINUTES (ANESTHESIA): Performed by: INTERNAL MEDICINE

## 2024-03-26 RX ORDER — PROPOFOL 10 MG/ML
INJECTION, EMULSION INTRAVENOUS CONTINUOUS PRN
Status: DISCONTINUED | OUTPATIENT
Start: 2024-03-26 | End: 2024-03-26 | Stop reason: SDUPTHER

## 2024-03-26 RX ORDER — GLYCOPYRROLATE 0.2 MG/ML
INJECTION INTRAMUSCULAR; INTRAVENOUS PRN
Status: DISCONTINUED | OUTPATIENT
Start: 2024-03-26 | End: 2024-03-26 | Stop reason: SDUPTHER

## 2024-03-26 RX ORDER — SODIUM CHLORIDE 9 MG/ML
INJECTION, SOLUTION INTRAVENOUS CONTINUOUS
Status: DISCONTINUED | OUTPATIENT
Start: 2024-03-26 | End: 2024-03-26 | Stop reason: HOSPADM

## 2024-03-26 RX ADMIN — SODIUM CHLORIDE: 9 INJECTION, SOLUTION INTRAVENOUS at 14:50

## 2024-03-26 RX ADMIN — PROPOFOL 50 MG: 10 INJECTION, EMULSION INTRAVENOUS at 14:53

## 2024-03-26 RX ADMIN — GLYCOPYRROLATE 0.2 MG: 0.2 INJECTION INTRAMUSCULAR; INTRAVENOUS at 15:13

## 2024-03-26 RX ADMIN — PROPOFOL 100 MCG/KG/MIN: 10 INJECTION, EMULSION INTRAVENOUS at 14:54

## 2024-03-26 ASSESSMENT — PAIN - FUNCTIONAL ASSESSMENT: PAIN_FUNCTIONAL_ASSESSMENT: 0-10

## 2024-03-26 NOTE — DISCHARGE INSTRUCTIONS
[facility]  Berto Kam M.D.  [phone]            COLON DISCHARGE INSTRUCTIONS       3/26/2024    Krystal Flores  :  1950  [facility] Medical Record Number:  [id number]      COLONOSCOPY FINDINGS:  Your colonoscopy showed two diminutive polyps that were removed and sent to pathology, diverticulosis and small internal hemorrhoids.    DISCOMFORT:  Redness at IV site- apply warm compress to area; if redness or soreness persist- contact your physician  There may be a slight amount of blood passed from the rectum  Gaseous discomfort- walking, belching will help relieve any discomfort  You may not operate a vehicle for 12 hours  You may not engage in an occupation involving machinery or appliances for rest of today  You may not drink alcoholic beverages for at least 12 hours  Avoid making any critical decisions for at least 24 hour  DIET:   High fiber diet   - however -  remember your colon is empty and a heavy meal will produce gas.   Avoid these foods:  vegetables, fried / greasy foods, carbonated drinks for today     ACTIVITY:  You may resume your normal daily activities it is recommended that you spend the remainder of the day resting -  avoid any strenuous activity.    CALL M.DCarmelo  ANY SIGN OF:   Increasing pain, nausea, vomiting  Abdominal distension (swelling)  New increased bleeding (oral or rectal)  Fever (chills)  Pain in chest area  Bloody discharge from nose or mouth   Shortness of breath    Follow-up Instructions:   Call Dr. Kam if any questions or problems.   Telephone # [phone]  Biopsy results will be available in  5 to 7 days  Repeat colonoscopy in 5 years.          :)

## 2024-03-26 NOTE — ANESTHESIA POSTPROCEDURE EVALUATION
Department of Anesthesiology  Postprocedure Note    Patient: Krystal Flores  MRN: [id number]  YOB: 1950  Date of evaluation: 3/26/2024    Procedure Summary       Date: 03/26/24 Room / [facility]    Anesthesia Start: 1449 Anesthesia Stop: 1522    Procedures:       COLONOSCOPY (Lower GI Region)      COLONOSCOPY POLYPECTOMY SNARE/COLD BIOPSY (Lower GI Region) Diagnosis:       Hx of colonic polyps      (Hx of colonic polyps [Z86.010])    Surgeons: Berto Kam MD Responsible Provider: Jorge Pryor MD    Anesthesia Type: MAC ASA Status: 2            Anesthesia Type: No value filed.    Monty Phase I: Monty Score: 10    Monty Phase II: Monty Score: 9    Anesthesia Post Evaluation    Patient location during evaluation: PACU  Patient participation: complete - patient participated  Level of consciousness: awake  Pain score: 0  Airway patency: patent  Nausea & Vomiting: no nausea and no vomiting  Cardiovascular status: blood pressure returned to baseline  Respiratory status: acceptable  Hydration status: euvolemic  Pain management: adequate    No notable events documented.

## 2024-03-26 NOTE — ANESTHESIA PRE PROCEDURE
Types: Cigarettes    Smokeless tobacco: Never   Substance Use Topics    Alcohol use: Yes     Alcohol/week: 2.0 standard drinks of alcohol     Comment: rare                                Ready to quit: Not Answered  Counseling given: Not Answered      Vital Signs (Current): There were no vitals filed for this visit.                                           BP Readings from Last 3 Encounters:   01/19/24 115/62   07/18/23 112/61   05/19/23 (!) 121/58       NPO Status: Time of last liquid consumption: 1100                        Time of last solid consumption: 1800                        Date of last liquid consumption: 03/26/24                        Date of last solid food consumption: 03/24/24    BMI:   Wt Readings from Last 3 Encounters:   01/19/24 91.4 kg (201 lb 6.4 oz)   08/11/23 94.3 kg (208 lb)   07/18/23 94.9 kg (209 lb 3.2 oz)     There is no height or weight on file to calculate BMI.    CBC: No results found for: \"WBC\", \"RBC\", \"HGB\", \"HCT\", \"MCV\", \"RDW\", \"PLT\"    CMP:   Lab Results   Component Value Date/Time     01/19/2024 10:06 AM    K 3.8 01/19/2024 10:06 AM     01/19/2024 10:06 AM    CO2 30 01/19/2024 10:06 AM    BUN 14 01/19/2024 10:06 AM    CREATININE 1.00 01/19/2024 10:06 AM    GFRAA >60 08/04/2022 01:01 PM    AGRATIO 1.1 01/19/2024 10:06 AM    AGRATIO 0.9 01/18/2023 08:26 AM    LABGLOM >60 01/19/2024 10:06 AM    GLUCOSE 91 01/19/2024 10:06 AM    PROT 7.4 01/19/2024 10:06 AM    CALCIUM 9.6 01/19/2024 10:06 AM    BILITOT 0.4 01/19/2024 10:06 AM    ALKPHOS 110 01/19/2024 10:06 AM    ALKPHOS 115 01/18/2023 08:26 AM    AST 19 01/19/2024 10:06 AM    ALT 20 01/19/2024 10:06 AM       POC Tests: No results for input(s): \"POCGLU\", \"POCNA\", \"POCK\", \"POCCL\", \"POCBUN\", \"POCHEMO\", \"POCHCT\" in the last 72 hours.    Coags: No results found for: \"PROTIME\", \"INR\", \"APTT\"    HCG (If Applicable): No results found for: \"PREGTESTUR\", \"PREGSERUM\", \"HCG\", \"HCGQUANT\"     ABGs: No results found for: \"PHART\",

## 2024-03-26 NOTE — H&P
[facility]  Berto Kam M.D.  [phone]    History and Physical       NAME:  Krystal Flores   :   1950   MRN:   [id number]           Consult Date: 3/26/2024 2:44 PM    Chief Complaint:  Colon polyp surveillance    History of Present Illness:  Patient is a 73 y.o. who is seen for colon polyp surveillance. Denies any ongoing GI complaints.      PMH:  Past Medical History:   Diagnosis Date    Arthritis     ALL OVER    GERD (gastroesophageal reflux disease)     Gout     HTN (hypertension)     Hypercholesterolemia     Nephrolithiasis     Neuropathy     Polyclonal gammopathy     Pseudotumor (inflammatory) of orbit     Stroke (HCC)     TIA    TIA (transient ischemic attack)     complicated migraine    Urosepsis        PSH:  Past Surgical History:   Procedure Laterality Date    CATARACT REMOVAL Bilateral 2017    CHOLECYSTECTOMY, LAPAROSCOPIC      COLONOSCOPY      COLONOSCOPY N/A 2018    COLONOSCOPY performed by Berto Kam MD at [facility] ENDOSCOPY    HYSTERECTOMY (CERVIX STATUS UNKNOWN)      LITHOTRIPSY Right 2020    ORTHOPEDIC SURGERY      SPUR RIGHT FOOT    ORTHOPEDIC SURGERY Bilateral     BILATERAL HIP REPLACEMENTS    JUAN AND BSO (CERVIX REMOVED)      TOTAL KNEE ARTHROPLASTY Right     UROLOGICAL SURGERY      cysto for kidney stones (was septic)       Allergies:  Allergies   Allergen Reactions    Oxycodone-Acetaminophen Other (See Comments)     Sharp pain in neck-headache       Home Medications:  Prior to Admission Medications   Prescriptions Last Dose Informant Patient Reported? Taking?   Potassium Citrate ER (UROCIT-K) 15 MEQ (1620 MG) TBCR extended release tablet 3/26/2024  Yes No   Sig: TAKE 1 TABLET BY MOUTH TWICE DAILY   acetaminophen (TYLENOL) 500 MG tablet Past Month  Yes No   Sig: Take by mouth as needed   chlorthalidone (HYGROTON) 25 MG tablet 3/26/2024  Yes No   Sig: TAKE 1 TABLET BY MOUTH TWICE DAILY   colchicine (COLCRYS) 0.6 MG tablet 3/26/2024  No No   Sig:

## 2024-03-26 NOTE — OP NOTE
[facility]  Berto Kam M.D.  [phone]            3/26/2024          Colonoscopy Operative Report  Krystal Flores  :  1950  [facility] Medical Record Number:  [id number]      Indications:    Personal history of colon polyps (screening only)     :  Berto Kam MD    Referring Provider: Karen Chandra PA-C    Sedation:  MAC anesthesia    Pre-Procedural Exam:      Airway: clear,  No airway problems anticipated  Heart: RRR, without gallops or rubs  Lungs: clear bilaterally without wheezes, crackles, or rhonchi  Abdomen: soft, nontender, nondistended, bowel sounds present  Mental Status: awake, alert and oriented to person, place and time     Procedure Details:  After informed consent was obtained with all risks and benefits of procedure explained and preoperative exam completed, the patient was taken to the endoscopy suite and placed in the left lateral decubitus position.  Upon sequential sedation as per above, a digital rectal exam was performed. The Olympus videocolonoscope  was inserted in the rectum and carefully advanced to the cecum, which was identified by the ileocecal valve and appendiceal orifice.  The quality of preparation was good.  The colonoscope was slowly withdrawn with careful inspection and evaluation between folds. Retroflexion in the rectum was performed.    Findings:   Terminal Ileum: not intubated  Cecum: normal  Ascending Colon: 2  Sessile polyp(s), the largest 3 mm in size;  Transverse Colon: normal  Descending Colon: normal  Sigmoid: no mucosal lesion appreciated  mild diverticulosis;  Rectum: no mucosal lesion appreciated  Grade 1 internal hemorrhoid(s);    Interventions:  2 complete polypectomy were performed using cold snare  and the polyps were  retrieved    Specimen Removed:  specimen #1, 2, 3 mm in size, located in the ascending colon removed by cold snare and retrieved for pathology    Complications: None.     EBL:  None.    Impression:  Two

## 2024-03-26 NOTE — PROGRESS NOTES

## 2024-03-26 NOTE — PROGRESS NOTES
Endoscopy discharge instructions have been reviewed and given to patient.  The patient verbalized understanding and acceptance of instructions.      Dr. Kam discussed with patient and spouse procedure findings and next steps.

## 2024-04-02 DIAGNOSIS — E78.00 PURE HYPERCHOLESTEROLEMIA, UNSPECIFIED: ICD-10-CM

## 2024-04-02 RX ORDER — SIMVASTATIN 10 MG
TABLET ORAL
Qty: 90 TABLET | Refills: 1 | Status: SHIPPED | OUTPATIENT
Start: 2024-04-02

## 2024-04-02 NOTE — TELEPHONE ENCOUNTER
Refill request received from [facility] for   Requested Prescriptions     Pending Prescriptions Disp Refills    simvastatin (ZOCOR) 10 MG tablet [Pharmacy Med Name: SIMVASTATIN 10 MG TABLET] 90 tablet 1     Sig: TAKE 1 TABLET BY MOUTH EVERY DAY     Last office visit: 1/19/2024   Next office visit: Visit date not found     Routed to MALCOLM Mathis for review.

## 2024-04-23 DIAGNOSIS — I10 ESSENTIAL (PRIMARY) HYPERTENSION: ICD-10-CM

## 2024-04-23 RX ORDER — LISINOPRIL 5 MG/1
TABLET ORAL
Qty: 90 TABLET | Refills: 0 | Status: SHIPPED | OUTPATIENT
Start: 2024-04-23

## 2024-05-15 ENCOUNTER — TELEPHONE (OUTPATIENT)
Age: 74
End: 2024-05-15

## 2024-05-15 NOTE — TELEPHONE ENCOUNTER
Patient called in need of refill on tolterodine (DETROL LA) 2 MG extended release capsule  to be sent to pharmacy on file

## 2024-05-16 DIAGNOSIS — N39.41 URGE INCONTINENCE: ICD-10-CM

## 2024-05-16 RX ORDER — TOLTERODINE 2 MG/1
CAPSULE, EXTENDED RELEASE ORAL
Qty: 90 CAPSULE | Refills: 0 | Status: SHIPPED | OUTPATIENT
Start: 2024-05-16

## 2024-05-16 NOTE — TELEPHONE ENCOUNTER
Refill request received from [facility] for   Requested Prescriptions     Pending Prescriptions Disp Refills    tolterodine (DETROL LA) 2 MG extended release capsule       Sig: Take by mouth daily     Last office visit: 1/19/2024   Next office visit: Visit date not found     Routed to MOISES Lozano for review.

## 2024-07-22 ENCOUNTER — TRANSCRIBE ORDERS (OUTPATIENT)
Facility: HOSPITAL | Age: 74
End: 2024-07-22

## 2024-07-22 DIAGNOSIS — I10 ESSENTIAL (PRIMARY) HYPERTENSION: ICD-10-CM

## 2024-07-22 DIAGNOSIS — Z12.31 SCREENING MAMMOGRAM FOR HIGH-RISK PATIENT: Primary | ICD-10-CM

## 2024-07-22 RX ORDER — LISINOPRIL 5 MG/1
TABLET ORAL
Qty: 90 TABLET | Refills: 0 | Status: SHIPPED | OUTPATIENT
Start: 2024-07-22 | End: 2024-07-26 | Stop reason: SDUPTHER

## 2024-07-26 ENCOUNTER — OFFICE VISIT (OUTPATIENT)
Age: 74
End: 2024-07-26
Payer: MEDICARE

## 2024-07-26 VITALS
BODY MASS INDEX: 29.95 KG/M2 | TEMPERATURE: 97.5 F | WEIGHT: 197 LBS | SYSTOLIC BLOOD PRESSURE: 103 MMHG | OXYGEN SATURATION: 100 % | DIASTOLIC BLOOD PRESSURE: 64 MMHG | RESPIRATION RATE: 18 BRPM | HEART RATE: 48 BPM

## 2024-07-26 DIAGNOSIS — R73.01 ELEVATED FASTING GLUCOSE: ICD-10-CM

## 2024-07-26 DIAGNOSIS — I10 ESSENTIAL HYPERTENSION: Primary | ICD-10-CM

## 2024-07-26 DIAGNOSIS — I10 ESSENTIAL HYPERTENSION: ICD-10-CM

## 2024-07-26 DIAGNOSIS — E78.00 PURE HYPERCHOLESTEROLEMIA: ICD-10-CM

## 2024-07-26 LAB
ALBUMIN SERPL-MCNC: 3.6 G/DL (ref 3.5–5)
ALBUMIN/GLOB SERPL: 1 (ref 1.1–2.2)
ALP SERPL-CCNC: 120 U/L (ref 45–117)
ALT SERPL-CCNC: 23 U/L (ref 12–78)
ANION GAP SERPL CALC-SCNC: 6 MMOL/L (ref 5–15)
AST SERPL-CCNC: 24 U/L (ref 15–37)
BILIRUB SERPL-MCNC: 0.5 MG/DL (ref 0.2–1)
BUN SERPL-MCNC: 18 MG/DL (ref 6–20)
BUN/CREAT SERPL: 17 (ref 12–20)
CALCIUM SERPL-MCNC: 9.4 MG/DL (ref 8.5–10.1)
CHLORIDE SERPL-SCNC: 107 MMOL/L (ref 97–108)
CHOLEST SERPL-MCNC: 149 MG/DL
CO2 SERPL-SCNC: 29 MMOL/L (ref 21–32)
CREAT SERPL-MCNC: 1.06 MG/DL (ref 0.55–1.02)
EST. AVERAGE GLUCOSE BLD GHB EST-MCNC: 103 MG/DL
GLOBULIN SER CALC-MCNC: 3.7 G/DL (ref 2–4)
GLUCOSE SERPL-MCNC: 86 MG/DL (ref 65–100)
HBA1C MFR BLD: 5.2 % (ref 4–5.6)
HDLC SERPL-MCNC: 52 MG/DL
HDLC SERPL: 2.9 (ref 0–5)
LDLC SERPL CALC-MCNC: 79.8 MG/DL (ref 0–100)
POTASSIUM SERPL-SCNC: 3.7 MMOL/L (ref 3.5–5.1)
PROT SERPL-MCNC: 7.3 G/DL (ref 6.4–8.2)
SODIUM SERPL-SCNC: 142 MMOL/L (ref 136–145)
TRIGL SERPL-MCNC: 86 MG/DL
VLDLC SERPL CALC-MCNC: 17.2 MG/DL

## 2024-07-26 PROCEDURE — 4004F PT TOBACCO SCREEN RCVD TLK: CPT | Performed by: PHYSICIAN ASSISTANT

## 2024-07-26 PROCEDURE — 99214 OFFICE O/P EST MOD 30 MIN: CPT | Performed by: PHYSICIAN ASSISTANT

## 2024-07-26 PROCEDURE — G2211 COMPLEX E/M VISIT ADD ON: HCPCS | Performed by: PHYSICIAN ASSISTANT

## 2024-07-26 PROCEDURE — 3017F COLORECTAL CA SCREEN DOC REV: CPT | Performed by: PHYSICIAN ASSISTANT

## 2024-07-26 PROCEDURE — G8417 CALC BMI ABV UP PARAM F/U: HCPCS | Performed by: PHYSICIAN ASSISTANT

## 2024-07-26 PROCEDURE — 1123F ACP DISCUSS/DSCN MKR DOCD: CPT | Performed by: PHYSICIAN ASSISTANT

## 2024-07-26 PROCEDURE — 3074F SYST BP LT 130 MM HG: CPT | Performed by: PHYSICIAN ASSISTANT

## 2024-07-26 PROCEDURE — 3078F DIAST BP <80 MM HG: CPT | Performed by: PHYSICIAN ASSISTANT

## 2024-07-26 PROCEDURE — G8427 DOCREV CUR MEDS BY ELIG CLIN: HCPCS | Performed by: PHYSICIAN ASSISTANT

## 2024-07-26 PROCEDURE — G8399 PT W/DXA RESULTS DOCUMENT: HCPCS | Performed by: PHYSICIAN ASSISTANT

## 2024-07-26 PROCEDURE — 1090F PRES/ABSN URINE INCON ASSESS: CPT | Performed by: PHYSICIAN ASSISTANT

## 2024-07-26 RX ORDER — SIMVASTATIN 10 MG
10 TABLET ORAL DAILY
Qty: 90 TABLET | Refills: 1 | Status: SHIPPED | OUTPATIENT
Start: 2024-07-26

## 2024-07-26 RX ORDER — CHLORTHALIDONE 25 MG/1
25 TABLET ORAL 2 TIMES DAILY
Qty: 180 TABLET | Refills: 1 | Status: SHIPPED | OUTPATIENT
Start: 2024-07-26

## 2024-07-26 RX ORDER — LISINOPRIL 5 MG/1
5 TABLET ORAL DAILY
Qty: 90 TABLET | Refills: 1 | Status: SHIPPED | OUTPATIENT
Start: 2024-07-26

## 2024-07-26 RX ORDER — POTASSIUM CITRATE 15 MEQ/1
15 TABLET, EXTENDED RELEASE ORAL 2 TIMES DAILY
Qty: 180 TABLET | Refills: 3 | Status: SHIPPED | OUTPATIENT
Start: 2024-07-26

## 2024-07-26 ASSESSMENT — ENCOUNTER SYMPTOMS
SHORTNESS OF BREATH: 0
COUGH: 0

## 2024-07-26 NOTE — PROGRESS NOTES
I have reviewed all needed documentation in preparation for visit. Verified patient by name and date of birth    Chief Complaint   Patient presents with    6 Month Follow-Up     refills       \"Have you been to the ER, urgent care clinic since your last visit?  Hospitalized since your last visit?\"    NO    “Have you seen or consulted any other health care providers outside of [facility] since your last visit?”    YES - When: approximately 1 months ago.  Where and Why: [facility], Dr Gordon.            Click Here for Release of Records Request    Vitals:    07/26/24 0820   BP: 103/64   Site: Right Upper Arm   Position: Sitting   Cuff Size: Medium Adult   Pulse: (!) 48   Resp: 18   Temp: 97.5 °F (36.4 °C)   TempSrc: Temporal   SpO2: 100%   Weight: 89.4 kg (197 lb)       Health Maintenance Due   Topic Date Due    Respiratory Syncytial Virus (RSV) Pregnant or age 60 yrs+ (1 - 1-dose 60+ series) Never done    COVID-19 Vaccine (6 - 2023-24 season) 09/01/2023    DTaP/Tdap/Td vaccine (2 - Td or Tdap) 09/05/2023    Annual Wellness Visit (Medicare)  01/19/2024    A1C test (Diabetic or Prediabetic)  07/25/2024    Lipids  07/25/2024       Kathy Vargas LPN  
Krystal Flores is a 74 y.o. female  Chief Complaint   Patient presents with    6 Month Follow-Up     refills     Vitals:    07/26/24 0820   BP: 103/64   Pulse: (!) 48   Resp: 18   Temp: 97.5 °F (36.4 °C)   SpO2: 100%      Wt Readings from Last 3 Encounters:   07/26/24 89.4 kg (197 lb)   03/26/24 88 kg (194 lb 0.1 oz)   01/19/24 91.4 kg (201 lb 6.4 oz)     BMI Readings from Last 3 Encounters:   07/26/24 29.95 kg/m²   03/26/24 29.50 kg/m²   01/19/24 30.62 kg/m²     Health Maintenance Due   Topic Date Due    Respiratory Syncytial Virus (RSV) Pregnant or age 60 yrs+ (1 - 1-dose 60+ series) Never done    COVID-19 Vaccine (6 - 2023-24 season) 09/01/2023    DTaP/Tdap/Td vaccine (2 - Td or Tdap) 09/05/2023    Annual Wellness Visit (Medicare)  01/19/2024    A1C test (Diabetic or Prediabetic)  07/25/2024    Lipids  07/25/2024     HPI  Pre-DM II Feeling well. Cutting back on calories recently.     Hemoglobin A1C (%)   Date Value   07/25/2023 5.6     Lab Results   Component Value Date    LDL 59.8 07/25/2023     Creatinine (MG/DL)   Date Value   01/19/2024 1.00     Currently taking no Diabetes Meds     Wt Readings from Last 3 Encounters:   07/26/24 89.4 kg (197 lb)   03/26/24 88 kg (194 lb 0.1 oz)   01/19/24 91.4 kg (201 lb 6.4 oz)     CV follow up  BP controlled:  BP Readings from Last 3 Encounters:   07/26/24 103/64   03/26/24 136/61   01/19/24 115/62     Currently taking:   Hypertension Medications       ACE Inhibitors       lisinopril (PRINIVIL;ZESTRIL) 5 MG tablet TAKE 1 TABLET BY MOUTH EVERY DAY       Thiazides and Thiazide-Like Diuretics       chlorthalidone (HYGROTON) 25 MG tablet TAKE 1 TABLET BY MOUTH TWICE DAILY            Creatinine (MG/DL)   Date Value   01/19/2024 1.00      Cholesterol Meds  simvastatin - 10 MG  Lab Results   Component Value Date    LDL 59.8 07/25/2023     LDL controlled at last check.     ROS  Review of Systems   Constitutional:  Negative for fever.   Respiratory:  Negative for cough and 
abdominal pain

## 2024-07-29 PROBLEM — N18.31 CKD STAGE 3A, GFR 45-59 ML/MIN (HCC): Status: ACTIVE | Noted: 2022-07-18

## 2024-08-15 ENCOUNTER — HOSPITAL ENCOUNTER (OUTPATIENT)
Facility: HOSPITAL | Age: 74
Discharge: HOME | End: 2024-08-15
Payer: MEDICARE

## 2024-08-15 VITALS — WEIGHT: 197 LBS | BODY MASS INDEX: 29.86 KG/M2 | HEIGHT: 68 IN

## 2024-08-15 DIAGNOSIS — N39.41 URGE INCONTINENCE: ICD-10-CM

## 2024-08-15 DIAGNOSIS — Z12.31 SCREENING MAMMOGRAM FOR HIGH-RISK PATIENT: ICD-10-CM

## 2024-08-15 PROCEDURE — 77067 SCR MAMMO BI INCL CAD: CPT

## 2024-08-16 RX ORDER — TOLTERODINE 2 MG/1
CAPSULE, EXTENDED RELEASE ORAL
Qty: 90 CAPSULE | Refills: 3 | Status: SHIPPED | OUTPATIENT
Start: 2024-08-16

## 2024-08-26 ENCOUNTER — TELEPHONE (OUTPATIENT)
Age: 74
End: 2024-08-26

## 2024-08-26 NOTE — TELEPHONE ENCOUNTER
Pt would like a script sent to the pharmacy for Covid tests. She states that if the has a script from her provider, her insurance will pay for the tests.

## 2024-08-26 NOTE — TELEPHONE ENCOUNTER
Spoke with pt verified pt identifiers , pt was given information and also given additional information to lab ca and pharmacy .

## 2024-09-25 ENCOUNTER — PATIENT MESSAGE (OUTPATIENT)
Age: 74
End: 2024-09-25

## 2024-09-25 DIAGNOSIS — I10 ESSENTIAL HYPERTENSION: Primary | ICD-10-CM

## 2024-11-06 ENCOUNTER — TELEPHONE (OUTPATIENT)
Age: 74
End: 2024-11-06

## 2024-11-06 NOTE — TELEPHONE ENCOUNTER
----- Message from Joselyn LUIS sent at 11/6/2024  9:13 AM EST -----  Regarding: ECC Escalation To Practice  ECC Escalation To Practice      Type of Escalation: Red Flag Symptom  --------------------------------------------------------------------------------------------------------------------------    Information for Provider:  Patient is looking for appointment for: Symptom . High Blood Pressure   Reasons for Message: Unable to reach practice     Additional Information . Patient have been experiencing high blood pressure for the past few days because of the changes in her medications and she would like to checked in with her provider about it.     --------------------------------------------------------------------------------------------------------------------------    Relationship to Patient: Self     Call Back Info: OK to leave message on voicemail  Preferred Call Back Number: Phone [phone]

## 2024-11-25 RX ORDER — NYSTATIN AND TRIAMCINOLONE ACETONIDE 100000; 1 [USP'U]/G; MG/G
CREAM TOPICAL 2 TIMES DAILY PRN
Qty: 60 G | Refills: 5 | Status: SHIPPED | OUTPATIENT
Start: 2024-11-25

## 2025-02-07 ENCOUNTER — PATIENT MESSAGE (OUTPATIENT)
Age: 75
End: 2025-02-07

## 2025-02-10 PROBLEM — Z90.79 S/P TAH-BSO (TOTAL ABDOMINAL HYSTERECTOMY AND BILATERAL SALPINGO-OOPHORECTOMY): Status: ACTIVE | Noted: 2022-01-25

## 2025-02-10 PROBLEM — G45.9 TIA (TRANSIENT ISCHEMIC ATTACK): Status: ACTIVE | Noted: 2022-01-25

## 2025-02-10 PROBLEM — Z90.710 S/P TAH-BSO (TOTAL ABDOMINAL HYSTERECTOMY AND BILATERAL SALPINGO-OOPHORECTOMY): Status: ACTIVE | Noted: 2022-01-25

## 2025-02-10 PROBLEM — Z98.890 HISTORY OF LITHOTRIPSY: Status: ACTIVE | Noted: 2022-01-25

## 2025-02-10 PROBLEM — Z90.722 S/P TAH-BSO (TOTAL ABDOMINAL HYSTERECTOMY AND BILATERAL SALPINGO-OOPHORECTOMY): Status: ACTIVE | Noted: 2022-01-25

## 2025-02-10 RX ORDER — LISINOPRIL 20 MG/1
20 TABLET ORAL DAILY
COMMUNITY
Start: 2024-11-26

## 2025-02-10 RX ORDER — METOPROLOL TARTRATE 25 MG/1
25 TABLET, FILM COATED ORAL 2 TIMES DAILY
COMMUNITY
Start: 2024-12-20 | End: 2025-02-13

## 2025-02-13 ENCOUNTER — OFFICE VISIT (OUTPATIENT)
Age: 75
End: 2025-02-13
Payer: MEDICARE

## 2025-02-13 VITALS
DIASTOLIC BLOOD PRESSURE: 76 MMHG | RESPIRATION RATE: 18 BRPM | OXYGEN SATURATION: 100 % | HEART RATE: 41 BPM | SYSTOLIC BLOOD PRESSURE: 128 MMHG | WEIGHT: 196 LBS | HEIGHT: 68 IN | BODY MASS INDEX: 29.7 KG/M2 | TEMPERATURE: 98.1 F

## 2025-02-13 DIAGNOSIS — I10 ESSENTIAL HYPERTENSION: ICD-10-CM

## 2025-02-13 DIAGNOSIS — E78.00 PURE HYPERCHOLESTEROLEMIA: ICD-10-CM

## 2025-02-13 DIAGNOSIS — R73.01 ELEVATED FASTING GLUCOSE: ICD-10-CM

## 2025-02-13 DIAGNOSIS — Z00.00 INITIAL MEDICARE ANNUAL WELLNESS VISIT: Primary | ICD-10-CM

## 2025-02-13 DIAGNOSIS — N18.30 STAGE 3 CHRONIC KIDNEY DISEASE, UNSPECIFIED WHETHER STAGE 3A OR 3B CKD (HCC): ICD-10-CM

## 2025-02-13 DIAGNOSIS — M1A.9XX0 CHRONIC GOUT WITHOUT TOPHUS, UNSPECIFIED CAUSE, UNSPECIFIED SITE: ICD-10-CM

## 2025-02-13 DIAGNOSIS — Z86.73 HISTORY OF STROKE: ICD-10-CM

## 2025-02-13 PROBLEM — E66.3 OVERWEIGHT (BMI 25.0-29.9): Status: ACTIVE | Noted: 2019-03-20

## 2025-02-13 LAB
ALBUMIN SERPL-MCNC: 3.6 G/DL (ref 3.5–5)
ALBUMIN/GLOB SERPL: 0.9 (ref 1.1–2.2)
ALP SERPL-CCNC: 127 U/L (ref 45–117)
ALT SERPL-CCNC: 23 U/L (ref 12–78)
ANION GAP SERPL CALC-SCNC: 4 MMOL/L (ref 2–12)
AST SERPL-CCNC: 22 U/L (ref 15–37)
BILIRUB SERPL-MCNC: 0.3 MG/DL (ref 0.2–1)
BUN SERPL-MCNC: 20 MG/DL (ref 6–20)
BUN/CREAT SERPL: 20 (ref 12–20)
CALCIUM SERPL-MCNC: 9.6 MG/DL (ref 8.5–10.1)
CHLORIDE SERPL-SCNC: 111 MMOL/L (ref 97–108)
CHOLEST SERPL-MCNC: 137 MG/DL
CO2 SERPL-SCNC: 29 MMOL/L (ref 21–32)
CREAT SERPL-MCNC: 1 MG/DL (ref 0.55–1.02)
EST. AVERAGE GLUCOSE BLD GHB EST-MCNC: 111 MG/DL
GLOBULIN SER CALC-MCNC: 3.8 G/DL (ref 2–4)
GLUCOSE SERPL-MCNC: 83 MG/DL (ref 65–100)
HBA1C MFR BLD: 5.5 % (ref 4–5.6)
HDLC SERPL-MCNC: 59 MG/DL
HDLC SERPL: 2.3 (ref 0–5)
LDLC SERPL CALC-MCNC: 68 MG/DL (ref 0–100)
POTASSIUM SERPL-SCNC: 4.4 MMOL/L (ref 3.5–5.1)
PROT SERPL-MCNC: 7.4 G/DL (ref 6.4–8.2)
SODIUM SERPL-SCNC: 144 MMOL/L (ref 136–145)
TRIGL SERPL-MCNC: 50 MG/DL
URATE SERPL-MCNC: 6.3 MG/DL (ref 2.6–6)
VLDLC SERPL CALC-MCNC: 10 MG/DL

## 2025-02-13 PROCEDURE — 99214 OFFICE O/P EST MOD 30 MIN: CPT | Performed by: PHYSICIAN ASSISTANT

## 2025-02-13 RX ORDER — CHLORTHALIDONE 25 MG/1
25 TABLET ORAL DAILY
COMMUNITY

## 2025-02-13 RX ORDER — SIMVASTATIN 10 MG
10 TABLET ORAL DAILY
Qty: 90 TABLET | Refills: 1 | Status: SHIPPED | OUTPATIENT
Start: 2025-02-13

## 2025-02-13 SDOH — ECONOMIC STABILITY: FOOD INSECURITY: WITHIN THE PAST 12 MONTHS, THE FOOD YOU BOUGHT JUST DIDN'T LAST AND YOU DIDN'T HAVE MONEY TO GET MORE.: NEVER TRUE

## 2025-02-13 SDOH — ECONOMIC STABILITY: FOOD INSECURITY: WITHIN THE PAST 12 MONTHS, YOU WORRIED THAT YOUR FOOD WOULD RUN OUT BEFORE YOU GOT MONEY TO BUY MORE.: NEVER TRUE

## 2025-02-13 ASSESSMENT — PATIENT HEALTH QUESTIONNAIRE - PHQ9
SUM OF ALL RESPONSES TO PHQ9 QUESTIONS 1 & 2: 0
SUM OF ALL RESPONSES TO PHQ QUESTIONS 1-9: 0
2. FEELING DOWN, DEPRESSED OR HOPELESS: NOT AT ALL
SUM OF ALL RESPONSES TO PHQ QUESTIONS 1-9: 0
1. LITTLE INTEREST OR PLEASURE IN DOING THINGS: NOT AT ALL

## 2025-02-13 ASSESSMENT — LIFESTYLE VARIABLES
HOW OFTEN DO YOU HAVE A DRINK CONTAINING ALCOHOL: MONTHLY OR LESS
HOW MANY STANDARD DRINKS CONTAINING ALCOHOL DO YOU HAVE ON A TYPICAL DAY: 1 OR 2

## 2025-02-13 ASSESSMENT — ENCOUNTER SYMPTOMS
COUGH: 0
SHORTNESS OF BREATH: 0

## 2025-02-13 NOTE — PROGRESS NOTES
Krystal Flores is a 74 y.o. female  Chief Complaint   Patient presents with    Medicare AWV     Check for available vaccines to take-      Vitals:    02/13/25 0846   BP: 128/76   Pulse: (!) 41   Resp: 18   Temp: 98.1 °F (36.7 °C)   SpO2: 100%      Wt Readings from Last 3 Encounters:   02/13/25 88.9 kg (196 lb)   08/15/24 89.4 kg (197 lb)   07/26/24 89.4 kg (197 lb)     BMI Readings from Last 3 Encounters:   02/13/25 29.80 kg/m²   08/15/24 29.95 kg/m²   07/26/24 29.95 kg/m²     There are no preventive care reminders to display for this patient.    HPI  CV follow up  BP controlled:  BP Readings from Last 3 Encounters:   02/13/25 128/76   07/26/24 103/64   03/26/24 136/61     Currently taking:   Hypertension Medications       ACE Inhibitors       lisinopril (PRINIVIL;ZESTRIL) 20 MG tablet Take 1 tablet by mouth daily       Thiazides and Thiazide-Like Diuretics       chlorthalidone (HYGROTON) 25 MG tablet Take 1 tablet by mouth daily            Creatinine (MG/DL)   Date Value   07/26/2024 1.06 (H)      CKD - seeing Nephrology regularly and doing well.     Cholesterol Meds  simvastatin - 10 MG  Lab Results   Component Value Date    LDL 79.8 07/26/2024     LDL controlled at last check.     Resolved Obesity - now overweight! Congratulated pt on weight loss success and encouraged continued efforts!  Wt Readings from Last 9 Encounters:   02/13/25 88.9 kg (196 lb)   08/15/24 89.4 kg (197 lb)   07/26/24 89.4 kg (197 lb)   03/26/24 88 kg (194 lb 0.1 oz)   01/19/24 91.4 kg (201 lb 6.4 oz)   08/11/23 94.3 kg (208 lb)   07/18/23 94.9 kg (209 lb 3.2 oz)   05/19/23 98.4 kg (217 lb)   01/18/23 98 kg (216 lb)     Pre-DM II - controlled. Feeling well.    Hemoglobin A1C (%)   Date Value   07/26/2024 5.2     Lab Results   Component Value Date    LDL 79.8 07/26/2024     Creatinine (MG/DL)   Date Value   07/26/2024 1.06 (H)     Currently taking no Diabetes Meds     Gout - has only had 2 flares in entire lifetime. None

## 2025-02-13 NOTE — PROGRESS NOTES
Medicare Annual Wellness Visit    Krystal Flores is here for Medicare AWV (Check for available vaccines to take- )    Assessment & Plan   Assessment & Plan  Initial Medicare annual wellness visit             No follow-ups on file.     Subjective     Patient's complete Health Risk Assessment and screening values have been reviewed and are found in Flowsheets. The following problems were reviewed today and where indicated follow up appointments were made and/or referrals ordered.    Positive Risk Factor Screenings with Interventions:             General HRA Questions:  Select all that apply: (!) Anger  Interventions - Anger:  Patient declined any further interventions or treatment               Tobacco Use:    Tobacco Use      Smoking status: Some Days        Packs/day: 0.10        Years: 0.1 packs/day for 40.0 years (4.0 ttl pk-yrs)        Types: Cigarettes      Smokeless tobacco: Never     Interventions:  Patient declined any further intervention or treatment          Objective   Vitals:    02/13/25 0846   BP: 128/76   Site: Left Upper Arm   Position: Sitting   Cuff Size: Medium Adult   Pulse: (!) 41   Resp: 18   Temp: 98.1 °F (36.7 °C)   TempSrc: Temporal   SpO2: 100%   Weight: 88.9 kg (196 lb)   Height: 1.727 m (5' 8\")      Body mass index is 29.8 kg/m².                   Allergies   Allergen Reactions    Oxycodone-Acetaminophen Other (See Comments)     Sharp pain in neck-headache     Prior to Visit Medications    Medication Sig Taking? Authorizing Provider   lisinopril (PRINIVIL;ZESTRIL) 20 MG tablet Take 1 tablet by mouth daily Yes Provider, MD Lala   tolterodine (DETROL LA) 2 MG extended release capsule TAKE 1 CAPSULE BY MOUTH EVERY DAY Yes Karen Chandra PA-C   chlorthalidone (HYGROTON) 25 MG tablet Take 1 tablet by mouth 2 times daily Yes Karen Chandra PA-C   simvastatin (ZOCOR) 10 MG tablet Take 1 tablet by mouth daily Yes Karen Chandra PA-C   diclofenac sodium (VOLTAREN) 1 % GEL APPLY 2

## 2025-02-13 NOTE — PROGRESS NOTES
I have reviewed all needed documentation in preparation for visit. Verified patient by name and date of birth  Chief Complaint   Patient presents with    Medicare AWV     Check for available vaccines to take-        Vitals:    02/13/25 0846   BP: 128/76   Site: Left Upper Arm   Position: Sitting   Cuff Size: Medium Adult   Pulse: (!) 41   Resp: 18   Temp: 98.1 °F (36.7 °C)   TempSrc: Temporal   SpO2: 100%   Weight: 88.9 kg (196 lb)   Height: 1.727 m (5' 8\")       Health Maintenance Due   Topic Date Due    Respiratory Syncytial Virus (RSV) Pregnant or age 60 yrs+ (1 - Risk 60-74 years 1-dose series) Never done    DTaP/Tdap/Td vaccine (2 - Td or Tdap) 09/05/2023    Annual Wellness Visit (Medicare)  01/19/2024    Depression Screen  01/19/2025     \"Have you been to the ER, urgent care clinic since your last visit?  Hospitalized since your last visit?\"    NO    “Have you seen or consulted any other health care providers outside of [facility] since your last visit?”    YES            Click Here for Release of Records Request         Janice carnes CMA

## 2025-02-18 PROBLEM — F43.21 GRIEF: Status: RESOLVED | Noted: 2019-05-28 | Resolved: 2025-02-18

## 2025-02-18 PROBLEM — H25.013 CORTICAL AGE-RELATED CATARACT OF BOTH EYES: Status: RESOLVED | Noted: 2017-07-25 | Resolved: 2025-02-18

## 2025-02-18 PROBLEM — M10.9 ACUTE GOUT OF RIGHT FOOT: Status: RESOLVED | Noted: 2023-05-19 | Resolved: 2025-02-18

## 2025-07-24 ENCOUNTER — OFFICE VISIT (OUTPATIENT)
Age: 75
End: 2025-07-24
Payer: MEDICARE

## 2025-07-24 VITALS
OXYGEN SATURATION: 100 % | BODY MASS INDEX: 28.85 KG/M2 | HEART RATE: 51 BPM | SYSTOLIC BLOOD PRESSURE: 116 MMHG | RESPIRATION RATE: 18 BRPM | WEIGHT: 190.4 LBS | TEMPERATURE: 98.2 F | HEIGHT: 68 IN | DIASTOLIC BLOOD PRESSURE: 63 MMHG

## 2025-07-24 DIAGNOSIS — E78.00 PURE HYPERCHOLESTEROLEMIA: ICD-10-CM

## 2025-07-24 DIAGNOSIS — L03.119 CELLULITIS OF LOWER EXTREMITY, UNSPECIFIED LATERALITY: Primary | ICD-10-CM

## 2025-07-24 PROBLEM — G45.9 TIA (TRANSIENT ISCHEMIC ATTACK): Status: RESOLVED | Noted: 2022-01-25 | Resolved: 2025-07-24

## 2025-07-24 PROCEDURE — 99214 OFFICE O/P EST MOD 30 MIN: CPT | Performed by: PHYSICIAN ASSISTANT

## 2025-07-24 PROCEDURE — G8427 DOCREV CUR MEDS BY ELIG CLIN: HCPCS | Performed by: PHYSICIAN ASSISTANT

## 2025-07-24 PROCEDURE — 1090F PRES/ABSN URINE INCON ASSESS: CPT | Performed by: PHYSICIAN ASSISTANT

## 2025-07-24 PROCEDURE — 3074F SYST BP LT 130 MM HG: CPT | Performed by: PHYSICIAN ASSISTANT

## 2025-07-24 PROCEDURE — 3078F DIAST BP <80 MM HG: CPT | Performed by: PHYSICIAN ASSISTANT

## 2025-07-24 PROCEDURE — 1123F ACP DISCUSS/DSCN MKR DOCD: CPT | Performed by: PHYSICIAN ASSISTANT

## 2025-07-24 PROCEDURE — 3017F COLORECTAL CA SCREEN DOC REV: CPT | Performed by: PHYSICIAN ASSISTANT

## 2025-07-24 PROCEDURE — G8399 PT W/DXA RESULTS DOCUMENT: HCPCS | Performed by: PHYSICIAN ASSISTANT

## 2025-07-24 PROCEDURE — 4004F PT TOBACCO SCREEN RCVD TLK: CPT | Performed by: PHYSICIAN ASSISTANT

## 2025-07-24 PROCEDURE — G2211 COMPLEX E/M VISIT ADD ON: HCPCS | Performed by: PHYSICIAN ASSISTANT

## 2025-07-24 PROCEDURE — 1159F MED LIST DOCD IN RCRD: CPT | Performed by: PHYSICIAN ASSISTANT

## 2025-07-24 PROCEDURE — G8417 CALC BMI ABV UP PARAM F/U: HCPCS | Performed by: PHYSICIAN ASSISTANT

## 2025-07-24 PROCEDURE — 1160F RVW MEDS BY RX/DR IN RCRD: CPT | Performed by: PHYSICIAN ASSISTANT

## 2025-07-24 RX ORDER — SIMVASTATIN 10 MG
10 TABLET ORAL DAILY
Qty: 90 TABLET | Refills: 1 | Status: SHIPPED | OUTPATIENT
Start: 2025-07-24

## 2025-07-24 ASSESSMENT — ENCOUNTER SYMPTOMS
SHORTNESS OF BREATH: 0
COUGH: 0

## 2025-07-24 NOTE — PROGRESS NOTES
I have reviewed all needed documentation in preparation for visit. Verified patient by name and date of birth  Chief Complaint   Patient presents with    Follow-up     Emergency Department- patient stated she was dx with cellulitis ; pharmacist advised patient to consult with PCP about holding Statin because of interactions with antibiotic        Vitals:    07/24/25 1124   BP: 116/63   BP Site: Left Upper Arm   Patient Position: Sitting   BP Cuff Size: Medium Adult   Pulse: 51   Resp: 18   Temp: 98.2 °F (36.8 °C)   TempSrc: Temporal   SpO2: 100%   Weight: 86.4 kg (190 lb 6.4 oz)   Height: 1.727 m (5' 8\")       Health Maintenance Due   Topic Date Due    COVID-19 Vaccine (8 - 2024-25 season) 03/18/2025     \"Have you been to the ER, urgent care clinic since your last visit?  Hospitalized since your last visit?\"    YES - When: approximately 9 days ago.  Where and Why: [facility] ED - Cellulitis.    “Have you seen or consulted any other health care providers outside of [facility] since your last visit?”    NO            Click Here for Release of Records Request         ADELA Holder

## 2025-07-24 NOTE — PROGRESS NOTES
Krystal Flores is a 75 y.o. female  Chief Complaint   Patient presents with    Follow-up     Emergency Department- patient stated she was dx with cellulitis ; pharmacist advised patient to consult with PCP about holding Statin because of interactions with antibiotic      Vitals:    07/24/25 1124   BP: 116/63   BP Site: Left Upper Arm   Patient Position: Sitting   BP Cuff Size: Medium Adult   Pulse: 51   Resp: 18   Temp: 98.2 °F (36.8 °C)   TempSrc: Temporal   SpO2: 100%   Weight: 86.4 kg (190 lb 6.4 oz)   Height: 1.727 m (5' 8\")      Wt Readings from Last 3 Encounters:   07/24/25 86.4 kg (190 lb 6.4 oz)   02/13/25 88.9 kg (196 lb)   08/15/24 89.4 kg (197 lb)     BMI Readings from Last 3 Encounters:   07/24/25 28.95 kg/m²   02/13/25 29.80 kg/m²   08/15/24 29.95 kg/m²     Health Maintenance Due   Topic Date Due    COVID-19 Vaccine (8 - 2024-25 season) 03/18/2025     HPI  Cellulitis foot. Seen in the ER. CBC normal. Given antibiotic rx - taking this and temporarily stopped statin while on this.   Cellulitis is improving nicely.     CV follow up  BP controlled:  BP Readings from Last 3 Encounters:   07/24/25 116/63   02/13/25 128/76   07/26/24 103/64     Currently taking:   Hypertension Medications       ACE Inhibitors       lisinopril (PRINIVIL;ZESTRIL) 20 MG tablet Take 1 tablet by mouth daily       Thiazides and Thiazide-Like Diuretics       chlorthalidone (HYGROTON) 25 MG tablet Take 1 tablet by mouth daily            Creatinine (MG/DL)   Date Value   02/13/2025 1.00      Cholesterol Meds  simvastatin - 10 MG  Lab Results   Component Value Date    LDL 68 02/13/2025     LDL controlled at last check.      ROS  Review of Systems   Constitutional:  Negative for fever.   Respiratory:  Negative for cough and shortness of breath.    Cardiovascular:  Negative for chest pain and palpitations.   Skin:  Negative for rash.   Neurological:  Negative for headaches.   Psychiatric/Behavioral:  Negative for dysphoric mood.

## 2025-08-26 ENCOUNTER — HOSPITAL ENCOUNTER (OUTPATIENT)
Facility: HOSPITAL | Age: 75
Discharge: HOME | End: 2025-08-29
Payer: MEDICARE

## 2025-08-26 VITALS — WEIGHT: 190 LBS | BODY MASS INDEX: 28.89 KG/M2

## 2025-08-26 DIAGNOSIS — Z12.31 VISIT FOR SCREENING MAMMOGRAM: ICD-10-CM

## 2025-08-26 PROCEDURE — 77067 SCR MAMMO BI INCL CAD: CPT

## 2025-08-27 ENCOUNTER — PATIENT MESSAGE (OUTPATIENT)
Age: 75
End: 2025-08-27

## 2025-09-03 ENCOUNTER — TELEPHONE (OUTPATIENT)
Age: 75
End: 2025-09-03

## (undated) DEVICE — SNARE ENDOSCP M L240CM W27MM SHTH DIA2.4MM CHN 2.8MM OVL

## (undated) DEVICE — 3M™ CUROS™ DISINFECTING CAP FOR NEEDLELESS CONNECTORS 270/CARTON 20 CARTONS/CASE CFF1-270: Brand: CUROS™

## (undated) DEVICE — Device

## (undated) DEVICE — KIT COLON W/ 1.1OZ LUB AND 2 END

## (undated) DEVICE — POLYP TRAP: Brand: TRAPEASE®

## (undated) DEVICE — 1200 GUARD II KIT W/5MM TUBE W/O VAC TUBE: Brand: GUARDIAN

## (undated) DEVICE — SET ADMIN 16ML TBNG L100IN 2 Y INJ SITE IV PIGGY BK DISP

## (undated) DEVICE — SIMPLICITY FLUFF UNDERPAD 23X36, MODERATE: Brand: SIMPLICITY

## (undated) DEVICE — CONTAINER SPEC 20 ML LID NEUT BUFF FORMALIN 10 % POLYPR STS

## (undated) DEVICE — SET GRAV CK VLV NEEDLESS ST 3 GANGED 4WAY STPCOCK HI FLO 10

## (undated) DEVICE — CANN NASAL O2 CAPNOGRAPHY AD -- FILTERLINE

## (undated) DEVICE — SOLIDIFIER MEDC 1200ML -- CONVERT TO 356117

## (undated) DEVICE — BAG BELONG PT PERS CLEAR HANDL

## (undated) DEVICE — BAG SPEC BIOHZRD 10 X 10 IN --

## (undated) DEVICE — CUFF RMFG BP INF SZ 11L DISP --

## (undated) DEVICE — ADULT SPO2 SENSOR: Brand: NELLCOR

## (undated) DEVICE — KENDALL RADIOLUCENT FOAM MONITORING ELECTRODE -RECTANGULAR SHAPE: Brand: KENDALL

## (undated) DEVICE — CATH IV AUTOGRD BC BLU 22GA 25 -- INSYTE